# Patient Record
Sex: FEMALE | Race: WHITE | Employment: OTHER | URBAN - METROPOLITAN AREA
[De-identification: names, ages, dates, MRNs, and addresses within clinical notes are randomized per-mention and may not be internally consistent; named-entity substitution may affect disease eponyms.]

---

## 2018-09-05 ENCOUNTER — OFFICE VISIT (OUTPATIENT)
Dept: ENT CLINIC | Age: 73
End: 2018-09-05
Payer: MEDICARE

## 2018-09-05 VITALS
OXYGEN SATURATION: 100 % | HEIGHT: 60 IN | BODY MASS INDEX: 20.62 KG/M2 | HEART RATE: 78 BPM | DIASTOLIC BLOOD PRESSURE: 57 MMHG | WEIGHT: 105 LBS | SYSTOLIC BLOOD PRESSURE: 116 MMHG

## 2018-09-05 DIAGNOSIS — R42 VERTIGO: ICD-10-CM

## 2018-09-05 DIAGNOSIS — H61.23 BILATERAL IMPACTED CERUMEN: Primary | ICD-10-CM

## 2018-09-05 PROCEDURE — 99203 OFFICE O/P NEW LOW 30 MIN: CPT | Performed by: PHYSICIAN ASSISTANT

## 2018-09-05 PROCEDURE — 69210 REMOVE IMPACTED EAR WAX UNI: CPT | Performed by: PHYSICIAN ASSISTANT

## 2018-09-05 RX ORDER — MULTIVIT WITH MINERALS/LUTEIN
1000 TABLET ORAL DAILY
COMMUNITY

## 2018-09-05 RX ORDER — CALCIUM CARBONATE 500(1250)
500 TABLET ORAL DAILY
COMMUNITY

## 2018-09-05 ASSESSMENT — ENCOUNTER SYMPTOMS
GASTROINTESTINAL NEGATIVE: 1
SHORTNESS OF BREATH: 0
NAUSEA: 0
RESPIRATORY NEGATIVE: 1
SINUS PAIN: 0
VOMITING: 0
STRIDOR: 0
EYES NEGATIVE: 1
RHINORRHEA: 0

## 2018-09-05 NOTE — PROGRESS NOTES
respiratory distress. Musculoskeletal: Normal range of motion. Neurological: She is alert. No cranial nerve deficit. Skin: Skin is warm. No erythema. Psychiatric: She has a normal mood and affect. Her behavior is normal. Thought content normal.   Vitals reviewed. Cerumen removal     Auditory canal(s) both ears completely obstructed with cerumen. A microscope was used due to deep impaction of the cerumen in both ears. Cerumen was gently removed using loop. Tympanic membranes are intact following the procedure. Auditory canals appear normal.      DIAGNOSIS:    ICD-10-CM ICD-9-CM    1. Bilateral impacted cerumen H61.23 380.4 NM REMOVAL IMPACTED CERUMEN INSTRUMENTATION UNILAT   2. Vertigo R42 780.4      IMPRESSION/PLAN:  Patient that she can use Debrox regularly for maintenance. Advised patient that I would recommend vestibular therapy due to her chronic vertigo. Currently she defers. May reassess next visit. - Patient advised if vertigo symptoms occur to leave a telephone encounter for me and I will attempt to see the patient within a week, however advised patient that I'm only in the office in [facility] half a day once a week. If patient cannot get into the office as soon as she would like advised to report to the emergency department. Will attempt to get Dr. Veronica Lopez records including hearing evaluation. The plan was explained and patient is without any further questions. Follow up in 1 year(s), or if any new or worsening symptoms call the office to be seen sooner or report to the emergency department. This note was done using voice recognition software. There may be accidental errors in grammar or spelling.      EFRMIN Reyes

## 2025-04-01 ENCOUNTER — APPOINTMENT (OUTPATIENT)
Dept: CT IMAGING | Age: 80
DRG: 417 | End: 2025-04-01
Payer: MEDICARE

## 2025-04-01 ENCOUNTER — APPOINTMENT (OUTPATIENT)
Dept: GENERAL RADIOLOGY | Age: 80
DRG: 417 | End: 2025-04-01
Payer: MEDICARE

## 2025-04-01 ENCOUNTER — APPOINTMENT (OUTPATIENT)
Dept: ULTRASOUND IMAGING | Age: 80
DRG: 417 | End: 2025-04-01
Payer: MEDICARE

## 2025-04-01 ENCOUNTER — HOSPITAL ENCOUNTER (INPATIENT)
Age: 80
LOS: 3 days | Discharge: HOME | DRG: 417 | End: 2025-04-04
Attending: EMERGENCY MEDICINE | Admitting: INTERNAL MEDICINE
Payer: MEDICARE

## 2025-04-01 DIAGNOSIS — K85.10 ACUTE BILIARY PANCREATITIS WITHOUT INFECTION OR NECROSIS: ICD-10-CM

## 2025-04-01 DIAGNOSIS — K80.12 CALCULUS OF GALLBLADDER WITH ACUTE ON CHRONIC CHOLECYSTITIS WITHOUT OBSTRUCTION: ICD-10-CM

## 2025-04-01 DIAGNOSIS — K85.90 ACUTE PANCREATITIS, UNSPECIFIED COMPLICATION STATUS, UNSPECIFIED PANCREATITIS TYPE: Primary | ICD-10-CM

## 2025-04-01 LAB
ALBUMIN SERPL-MCNC: 4.4 G/DL (ref 3.5–5.2)
ALP SERPL-CCNC: 132 U/L (ref 35–104)
ALT SERPL-CCNC: 409 U/L (ref 0–32)
ANION GAP SERPL CALCULATED.3IONS-SCNC: 12 MMOL/L (ref 7–16)
AST SERPL-CCNC: 689 U/L (ref 0–31)
BASOPHILS # BLD: 0.01 K/UL (ref 0–0.2)
BASOPHILS NFR BLD: 0 % (ref 0–2)
BILIRUB SERPL-MCNC: 2.1 MG/DL (ref 0–1.2)
BNP SERPL-MCNC: 109 PG/ML (ref 0–450)
BUN SERPL-MCNC: 23 MG/DL (ref 6–23)
CALCIUM SERPL-MCNC: 9.7 MG/DL (ref 8.6–10.2)
CHLORIDE SERPL-SCNC: 104 MMOL/L (ref 98–107)
CO2 SERPL-SCNC: 24 MMOL/L (ref 22–29)
CREAT SERPL-MCNC: 0.8 MG/DL (ref 0.5–1)
D-DIMER QUANTITATIVE: 4283 NG/ML DDU (ref 0–230)
EOSINOPHIL # BLD: 0.01 K/UL (ref 0.05–0.5)
EOSINOPHILS RELATIVE PERCENT: 0 % (ref 0–6)
ERYTHROCYTE [DISTWIDTH] IN BLOOD BY AUTOMATED COUNT: 12.6 % (ref 11.5–15)
GFR, ESTIMATED: 76 ML/MIN/1.73M2
GLUCOSE SERPL-MCNC: 97 MG/DL (ref 74–99)
HCT VFR BLD AUTO: 38 % (ref 34–48)
HGB BLD-MCNC: 12.1 G/DL (ref 11.5–15.5)
IMM GRANULOCYTES # BLD AUTO: 0.04 K/UL (ref 0–0.58)
IMM GRANULOCYTES NFR BLD: 0 % (ref 0–5)
LACTATE BLDV-SCNC: 0.7 MMOL/L (ref 0.5–2.2)
LIPASE SERPL-CCNC: >3000 U/L (ref 13–60)
LYMPHOCYTES NFR BLD: 0.62 K/UL (ref 1.5–4)
LYMPHOCYTES RELATIVE PERCENT: 6 % (ref 20–42)
MCH RBC QN AUTO: 30.3 PG (ref 26–35)
MCHC RBC AUTO-ENTMCNC: 31.8 G/DL (ref 32–34.5)
MCV RBC AUTO: 95.2 FL (ref 80–99.9)
MONOCYTES NFR BLD: 0.91 K/UL (ref 0.1–0.95)
MONOCYTES NFR BLD: 9 % (ref 2–12)
NEUTROPHILS NFR BLD: 85 % (ref 43–80)
NEUTS SEG NFR BLD: 9.15 K/UL (ref 1.8–7.3)
PLATELET # BLD AUTO: 166 K/UL (ref 130–450)
PMV BLD AUTO: 10.5 FL (ref 7–12)
POTASSIUM SERPL-SCNC: 4 MMOL/L (ref 3.5–5)
PROT SERPL-MCNC: 6.9 G/DL (ref 6.4–8.3)
RBC # BLD AUTO: 3.99 M/UL (ref 3.5–5.5)
SODIUM SERPL-SCNC: 140 MMOL/L (ref 132–146)
TROPONIN I SERPL HS-MCNC: 31 NG/L (ref 0–9)
TROPONIN I SERPL HS-MCNC: 31 NG/L (ref 0–9)
WBC OTHER # BLD: 10.7 K/UL (ref 4.5–11.5)

## 2025-04-01 PROCEDURE — 99285 EMERGENCY DEPT VISIT HI MDM: CPT

## 2025-04-01 PROCEDURE — 6360000004 HC RX CONTRAST MEDICATION: Performed by: RADIOLOGY

## 2025-04-01 PROCEDURE — 2580000003 HC RX 258: Performed by: EMERGENCY MEDICINE

## 2025-04-01 PROCEDURE — 93005 ELECTROCARDIOGRAM TRACING: CPT | Performed by: EMERGENCY MEDICINE

## 2025-04-01 PROCEDURE — 1200000000 HC SEMI PRIVATE

## 2025-04-01 PROCEDURE — 76705 ECHO EXAM OF ABDOMEN: CPT

## 2025-04-01 PROCEDURE — 2580000003 HC RX 258: Performed by: NURSE PRACTITIONER

## 2025-04-01 PROCEDURE — 96374 THER/PROPH/DIAG INJ IV PUSH: CPT

## 2025-04-01 PROCEDURE — 85025 COMPLETE CBC W/AUTO DIFF WBC: CPT

## 2025-04-01 PROCEDURE — 2500000003 HC RX 250 WO HCPCS: Performed by: NURSE PRACTITIONER

## 2025-04-01 PROCEDURE — 71275 CT ANGIOGRAPHY CHEST: CPT

## 2025-04-01 PROCEDURE — 83690 ASSAY OF LIPASE: CPT

## 2025-04-01 PROCEDURE — 6360000002 HC RX W HCPCS: Performed by: EMERGENCY MEDICINE

## 2025-04-01 PROCEDURE — 85379 FIBRIN DEGRADATION QUANT: CPT

## 2025-04-01 PROCEDURE — 74177 CT ABD & PELVIS W/CONTRAST: CPT

## 2025-04-01 PROCEDURE — 80053 COMPREHEN METABOLIC PANEL: CPT

## 2025-04-01 PROCEDURE — 83605 ASSAY OF LACTIC ACID: CPT

## 2025-04-01 PROCEDURE — 84484 ASSAY OF TROPONIN QUANT: CPT

## 2025-04-01 PROCEDURE — 83880 ASSAY OF NATRIURETIC PEPTIDE: CPT

## 2025-04-01 PROCEDURE — 71046 X-RAY EXAM CHEST 2 VIEWS: CPT

## 2025-04-01 RX ORDER — SODIUM CHLORIDE 0.9 % (FLUSH) 0.9 %
10 SYRINGE (ML) INJECTION EVERY 12 HOURS SCHEDULED
Status: DISCONTINUED | OUTPATIENT
Start: 2025-04-01 | End: 2025-04-04 | Stop reason: HOSPADM

## 2025-04-01 RX ORDER — ONDANSETRON 4 MG/1
4 TABLET, ORALLY DISINTEGRATING ORAL EVERY 8 HOURS PRN
Status: DISCONTINUED | OUTPATIENT
Start: 2025-04-01 | End: 2025-04-04 | Stop reason: HOSPADM

## 2025-04-01 RX ORDER — SODIUM CHLORIDE, SODIUM LACTATE, POTASSIUM CHLORIDE, CALCIUM CHLORIDE 600; 310; 30; 20 MG/100ML; MG/100ML; MG/100ML; MG/100ML
INJECTION, SOLUTION INTRAVENOUS CONTINUOUS
Status: DISCONTINUED | OUTPATIENT
Start: 2025-04-01 | End: 2025-04-02

## 2025-04-01 RX ORDER — ENOXAPARIN SODIUM 100 MG/ML
30 INJECTION SUBCUTANEOUS DAILY
Status: DISCONTINUED | OUTPATIENT
Start: 2025-04-02 | End: 2025-04-04 | Stop reason: HOSPADM

## 2025-04-01 RX ORDER — SODIUM CHLORIDE 9 MG/ML
INJECTION, SOLUTION INTRAVENOUS PRN
Status: DISCONTINUED | OUTPATIENT
Start: 2025-04-01 | End: 2025-04-04 | Stop reason: HOSPADM

## 2025-04-01 RX ORDER — ACETAMINOPHEN 325 MG/1
650 TABLET ORAL EVERY 6 HOURS PRN
Status: CANCELLED | OUTPATIENT
Start: 2025-04-01

## 2025-04-01 RX ORDER — MECLIZINE HYDROCHLORIDE 25 MG/1
25 TABLET ORAL 3 TIMES DAILY PRN
COMMUNITY

## 2025-04-01 RX ORDER — MAGNESIUM SULFATE IN WATER 40 MG/ML
2000 INJECTION, SOLUTION INTRAVENOUS PRN
Status: DISCONTINUED | OUTPATIENT
Start: 2025-04-01 | End: 2025-04-04 | Stop reason: HOSPADM

## 2025-04-01 RX ORDER — POTASSIUM CHLORIDE 1500 MG/1
40 TABLET, EXTENDED RELEASE ORAL PRN
Status: DISCONTINUED | OUTPATIENT
Start: 2025-04-01 | End: 2025-04-04 | Stop reason: HOSPADM

## 2025-04-01 RX ORDER — IOPAMIDOL 755 MG/ML
75 INJECTION, SOLUTION INTRAVASCULAR
Status: COMPLETED | OUTPATIENT
Start: 2025-04-01 | End: 2025-04-01

## 2025-04-01 RX ORDER — MORPHINE SULFATE 2 MG/ML
2 INJECTION, SOLUTION INTRAMUSCULAR; INTRAVENOUS EVERY 4 HOURS PRN
Status: DISCONTINUED | OUTPATIENT
Start: 2025-04-01 | End: 2025-04-03

## 2025-04-01 RX ORDER — ONDANSETRON 2 MG/ML
4 INJECTION INTRAMUSCULAR; INTRAVENOUS ONCE
Status: COMPLETED | OUTPATIENT
Start: 2025-04-01 | End: 2025-04-01

## 2025-04-01 RX ORDER — 0.9 % SODIUM CHLORIDE 0.9 %
1000 INTRAVENOUS SOLUTION INTRAVENOUS ONCE
Status: COMPLETED | OUTPATIENT
Start: 2025-04-01 | End: 2025-04-01

## 2025-04-01 RX ORDER — ACETAMINOPHEN 650 MG/1
650 SUPPOSITORY RECTAL EVERY 6 HOURS PRN
Status: CANCELLED | OUTPATIENT
Start: 2025-04-01

## 2025-04-01 RX ORDER — ONDANSETRON 2 MG/ML
4 INJECTION INTRAMUSCULAR; INTRAVENOUS EVERY 6 HOURS PRN
Status: DISCONTINUED | OUTPATIENT
Start: 2025-04-01 | End: 2025-04-04 | Stop reason: HOSPADM

## 2025-04-01 RX ORDER — OMEPRAZOLE 20 MG/1
20 CAPSULE, DELAYED RELEASE ORAL DAILY PRN
COMMUNITY

## 2025-04-01 RX ORDER — SODIUM CHLORIDE 0.9 % (FLUSH) 0.9 %
10 SYRINGE (ML) INJECTION PRN
Status: DISCONTINUED | OUTPATIENT
Start: 2025-04-01 | End: 2025-04-04 | Stop reason: HOSPADM

## 2025-04-01 RX ORDER — POTASSIUM CHLORIDE 7.45 MG/ML
10 INJECTION INTRAVENOUS PRN
Status: DISCONTINUED | OUTPATIENT
Start: 2025-04-01 | End: 2025-04-04 | Stop reason: HOSPADM

## 2025-04-01 RX ORDER — FENTANYL CITRATE 50 UG/ML
25 INJECTION, SOLUTION INTRAMUSCULAR; INTRAVENOUS
Status: DISCONTINUED | OUTPATIENT
Start: 2025-04-01 | End: 2025-04-03

## 2025-04-01 RX ADMIN — SODIUM CHLORIDE 1000 ML: 0.9 INJECTION, SOLUTION INTRAVENOUS at 16:04

## 2025-04-01 RX ADMIN — IOPAMIDOL 75 ML: 755 INJECTION, SOLUTION INTRAVENOUS at 17:42

## 2025-04-01 RX ADMIN — SODIUM CHLORIDE, PRESERVATIVE FREE 10 ML: 5 INJECTION INTRAVENOUS at 21:44

## 2025-04-01 RX ADMIN — ONDANSETRON 4 MG: 2 INJECTION, SOLUTION INTRAMUSCULAR; INTRAVENOUS at 18:04

## 2025-04-01 RX ADMIN — SODIUM CHLORIDE, SODIUM LACTATE, POTASSIUM CHLORIDE, AND CALCIUM CHLORIDE: .6; .31; .03; .02 INJECTION, SOLUTION INTRAVENOUS at 21:41

## 2025-04-01 ASSESSMENT — PAIN DESCRIPTION - LOCATION: LOCATION: ABDOMEN

## 2025-04-01 ASSESSMENT — LIFESTYLE VARIABLES
HOW MANY STANDARD DRINKS CONTAINING ALCOHOL DO YOU HAVE ON A TYPICAL DAY: PATIENT DOES NOT DRINK
HOW OFTEN DO YOU HAVE A DRINK CONTAINING ALCOHOL: NEVER

## 2025-04-01 ASSESSMENT — PAIN SCALES - GENERAL
PAINLEVEL_OUTOF10: 0
PAINLEVEL_OUTOF10: 10

## 2025-04-01 ASSESSMENT — PAIN - FUNCTIONAL ASSESSMENT
PAIN_FUNCTIONAL_ASSESSMENT: NONE - DENIES PAIN
PAIN_FUNCTIONAL_ASSESSMENT: 0-10
PAIN_FUNCTIONAL_ASSESSMENT: NONE - DENIES PAIN

## 2025-04-01 NOTE — ED PROVIDER NOTES
HPI:  4/1/25, Time: 3:45 PM EDT         Lory Rodriguez is a 79 y.o. female presenting to the ED for epigastric pain nausea rating to her abdomen.  Onset several days.  He is no longer nauseated now complaining just abdominal pain.  Epigastric and upper quadrants bilaterally states has a history of hiatal hernia unsure of when her last EGD was.  She called her PCP advised to come to the emergency department for evaluation, beginning several days ago.  The complaint has been persistent, moderate in severity, and worsened by eating .  Patient denies melena hematochezia hematemesis.  She has no urinary complaints.    Review of Systems:   A complete review of systems was performed and pertinent positives and negatives are stated within HPI, all other systems reviewed and are negative.    --------------------------------------------- PAST HISTORY ---------------------------------------------  Past Medical History:  has a past medical history of Hiatal hernia.    Past Surgical History:  has a past surgical history that includes Colonoscopy.    Social History:  reports that she has never smoked. She has never used smokeless tobacco. She reports that she does not currently use alcohol. She reports that she does not use drugs.    Family History: family history includes No Known Problems in her father and mother.     The patient’s home medications have been reviewed.    Allergies: Pcn [penicillins] and Strawberry extract    -------------------------------------------------- RESULTS -------------------------------------------------  All laboratory and radiology results have been personally reviewed by myself   LABS:  Results for orders placed or performed during the hospital encounter of 04/01/25   Troponin   Result Value Ref Range    Troponin, High Sensitivity 31 (H) 0 - 9 ng/L   Lipase   Result Value Ref Range    Lipase >3,000 (H) 13 - 60 U/L   CBC with Auto Differential   Result Value Ref Range    WBC 10.7 4.5 -

## 2025-04-01 NOTE — PROGRESS NOTES
Database initiated. Patient is A&O independent from home with . States she uses no assistive devices and is RA at baseline.

## 2025-04-01 NOTE — ED TRIAGE NOTES
FIRST PROVIDER CONTACT ASSESSMENT NOTE       Department of Emergency Medicine                 First Provider Note            25  12:23 PM EDT    Date of Encounter: No admission date for patient encounter.    Patient Name: Lory Rodriguez  : 1945  MRN: [id number]    Chief Complaint: Abdominal Pain, Chest Pain, and Numbness (Left arm)      History of Present Illness:   Lory Rodriguez is a 79 y.o. female who presents to the ED for CP, abdominal pain and left arm numbness     Focused Physical Exam:  VS:    ED Triage Vitals [25 1219]   BP Systolic BP Percentile Diastolic BP Percentile Temp Temp Source Pulse Respirations SpO2   134/65 -- -- 97.3 °F (36.3 °C) Oral 79 16 100 %      Height Weight - Scale         1.524 m (5') 47.2 kg (104 lb)              Physical Ex: Constitutional: Alert and non-toxic.    Medical History:  has a past medical history of Hiatal hernia.  Surgical History:  has a past surgical history that includes Colonoscopy.  Social History:  reports that she has never smoked. She has never used smokeless tobacco. She reports that she does not drink alcohol and does not use drugs.  Family History: family history includes No Known Problems in her father and mother.    Allergies: Penicillins     Initial Plan of Care: Initiate Treatment-Testing, Proceed toTreatment Area When Bed Available for ED Attending/MLP to Continue Care      ---END OF FIRST PROVIDER CONTACT ASSESSMENT NOTE---  Electronically signed by Anna Saucedo PA-C   DD: 25

## 2025-04-02 ENCOUNTER — ANESTHESIA EVENT (OUTPATIENT)
Dept: OPERATING ROOM | Age: 80
End: 2025-04-02
Payer: MEDICARE

## 2025-04-02 ENCOUNTER — APPOINTMENT (OUTPATIENT)
Dept: MRI IMAGING | Age: 80
DRG: 417 | End: 2025-04-02
Payer: MEDICARE

## 2025-04-02 PROBLEM — K85.90 ACUTE PANCREATITIS: Status: ACTIVE | Noted: 2025-04-02

## 2025-04-02 LAB
ALBUMIN SERPL-MCNC: 3.4 G/DL (ref 3.5–5.2)
ALP SERPL-CCNC: 104 U/L (ref 35–104)
ALT SERPL-CCNC: 247 U/L (ref 0–32)
ANION GAP SERPL CALCULATED.3IONS-SCNC: 10 MMOL/L (ref 7–16)
AST SERPL-CCNC: 241 U/L (ref 0–31)
BASOPHILS # BLD: 0.02 K/UL (ref 0–0.2)
BASOPHILS NFR BLD: 0 % (ref 0–2)
BILIRUB SERPL-MCNC: 1.1 MG/DL (ref 0–1.2)
BUN SERPL-MCNC: 17 MG/DL (ref 6–23)
CALCIUM SERPL-MCNC: 9 MG/DL (ref 8.6–10.2)
CHLORIDE SERPL-SCNC: 110 MMOL/L (ref 98–107)
CO2 SERPL-SCNC: 23 MMOL/L (ref 22–29)
CREAT SERPL-MCNC: 0.8 MG/DL (ref 0.5–1)
EKG ATRIAL RATE: 75 BPM
EKG P AXIS: 57 DEGREES
EKG P-R INTERVAL: 166 MS
EKG Q-T INTERVAL: 388 MS
EKG QRS DURATION: 66 MS
EKG QTC CALCULATION (BAZETT): 433 MS
EKG R AXIS: 77 DEGREES
EKG T AXIS: 78 DEGREES
EKG VENTRICULAR RATE: 75 BPM
EOSINOPHIL # BLD: 0.04 K/UL (ref 0.05–0.5)
EOSINOPHILS RELATIVE PERCENT: 1 % (ref 0–6)
ERYTHROCYTE [DISTWIDTH] IN BLOOD BY AUTOMATED COUNT: 12.9 % (ref 11.5–15)
GFR, ESTIMATED: 77 ML/MIN/1.73M2
GLUCOSE BLD-MCNC: 106 MG/DL (ref 74–99)
GLUCOSE BLD-MCNC: 44 MG/DL (ref 74–99)
GLUCOSE BLD-MCNC: 48 MG/DL (ref 74–99)
GLUCOSE BLD-MCNC: 66 MG/DL (ref 74–99)
GLUCOSE BLD-MCNC: 75 MG/DL (ref 74–99)
GLUCOSE BLD-MCNC: 80 MG/DL (ref 74–99)
GLUCOSE BLD-MCNC: 89 MG/DL (ref 74–99)
GLUCOSE SERPL-MCNC: 54 MG/DL (ref 74–99)
HAV IGM SERPL QL IA: NONREACTIVE
HBV CORE IGM SERPL QL IA: NONREACTIVE
HBV SURFACE AG SERPL QL IA: NONREACTIVE
HCT VFR BLD AUTO: 32.4 % (ref 34–48)
HCV AB SERPL QL IA: NONREACTIVE
HGB BLD-MCNC: 10.4 G/DL (ref 11.5–15.5)
IMM GRANULOCYTES # BLD AUTO: <0.03 K/UL (ref 0–0.58)
IMM GRANULOCYTES NFR BLD: 0 % (ref 0–5)
LIPASE SERPL-CCNC: 514 U/L (ref 13–60)
LYMPHOCYTES NFR BLD: 1.14 K/UL (ref 1.5–4)
LYMPHOCYTES RELATIVE PERCENT: 18 % (ref 20–42)
MCH RBC QN AUTO: 30.7 PG (ref 26–35)
MCHC RBC AUTO-ENTMCNC: 32.1 G/DL (ref 32–34.5)
MCV RBC AUTO: 95.6 FL (ref 80–99.9)
MONOCYTES NFR BLD: 0.44 K/UL (ref 0.1–0.95)
MONOCYTES NFR BLD: 7 % (ref 2–12)
NEUTROPHILS NFR BLD: 74 % (ref 43–80)
NEUTS SEG NFR BLD: 4.64 K/UL (ref 1.8–7.3)
PLATELET # BLD AUTO: 141 K/UL (ref 130–450)
PMV BLD AUTO: 10.6 FL (ref 7–12)
POTASSIUM SERPL-SCNC: 4 MMOL/L (ref 3.5–5)
PROT SERPL-MCNC: 5.6 G/DL (ref 6.4–8.3)
RBC # BLD AUTO: 3.39 M/UL (ref 3.5–5.5)
SODIUM SERPL-SCNC: 143 MMOL/L (ref 132–146)
WBC OTHER # BLD: 6.3 K/UL (ref 4.5–11.5)

## 2025-04-02 PROCEDURE — 2580000003 HC RX 258

## 2025-04-02 PROCEDURE — 99222 1ST HOSP IP/OBS MODERATE 55: CPT | Performed by: STUDENT IN AN ORGANIZED HEALTH CARE EDUCATION/TRAINING PROGRAM

## 2025-04-02 PROCEDURE — 80053 COMPREHEN METABOLIC PANEL: CPT

## 2025-04-02 PROCEDURE — 85025 COMPLETE CBC W/AUTO DIFF WBC: CPT

## 2025-04-02 PROCEDURE — 93010 ELECTROCARDIOGRAM REPORT: CPT | Performed by: INTERNAL MEDICINE

## 2025-04-02 PROCEDURE — 83690 ASSAY OF LIPASE: CPT

## 2025-04-02 PROCEDURE — 1200000000 HC SEMI PRIVATE

## 2025-04-02 PROCEDURE — 74183 MRI ABD W/O CNTR FLWD CNTR: CPT

## 2025-04-02 PROCEDURE — 2580000003 HC RX 258: Performed by: INTERNAL MEDICINE

## 2025-04-02 PROCEDURE — 6360000004 HC RX CONTRAST MEDICATION: Performed by: RADIOLOGY

## 2025-04-02 PROCEDURE — 82962 GLUCOSE BLOOD TEST: CPT

## 2025-04-02 PROCEDURE — 2500000003 HC RX 250 WO HCPCS: Performed by: NURSE PRACTITIONER

## 2025-04-02 PROCEDURE — A9579 GAD-BASE MR CONTRAST NOS,1ML: HCPCS | Performed by: RADIOLOGY

## 2025-04-02 PROCEDURE — 2580000003 HC RX 258: Performed by: NURSE PRACTITIONER

## 2025-04-02 PROCEDURE — 80074 ACUTE HEPATITIS PANEL: CPT

## 2025-04-02 PROCEDURE — 6360000002 HC RX W HCPCS: Performed by: NURSE PRACTITIONER

## 2025-04-02 RX ORDER — GLUCAGON 1 MG/ML
1 KIT INJECTION PRN
Status: DISCONTINUED | OUTPATIENT
Start: 2025-04-02 | End: 2025-04-02 | Stop reason: SDUPTHER

## 2025-04-02 RX ORDER — DEXTROSE MONOHYDRATE 100 MG/ML
INJECTION, SOLUTION INTRAVENOUS CONTINUOUS PRN
Status: DISCONTINUED | OUTPATIENT
Start: 2025-04-02 | End: 2025-04-04 | Stop reason: HOSPADM

## 2025-04-02 RX ORDER — DEXTROSE MONOHYDRATE AND SODIUM CHLORIDE 5; .45 G/100ML; G/100ML
INJECTION, SOLUTION INTRAVENOUS CONTINUOUS
Status: DISCONTINUED | OUTPATIENT
Start: 2025-04-02 | End: 2025-04-03

## 2025-04-02 RX ORDER — INDOCYANINE GREEN AND WATER 25 MG
5 KIT INJECTION
Status: COMPLETED | OUTPATIENT
Start: 2025-04-03 | End: 2025-04-03

## 2025-04-02 RX ORDER — GLUCAGON 1 MG/ML
1 KIT INJECTION PRN
Status: DISCONTINUED | OUTPATIENT
Start: 2025-04-02 | End: 2025-04-04 | Stop reason: HOSPADM

## 2025-04-02 RX ORDER — DEXTROSE MONOHYDRATE 100 MG/ML
INJECTION, SOLUTION INTRAVENOUS CONTINUOUS PRN
Status: DISCONTINUED | OUTPATIENT
Start: 2025-04-02 | End: 2025-04-02 | Stop reason: SDUPTHER

## 2025-04-02 RX ADMIN — DEXTROSE MONOHYDRATE 125 ML: 100 INJECTION, SOLUTION INTRAVENOUS at 06:06

## 2025-04-02 RX ADMIN — GADOTERIDOL 10 ML: 279.3 INJECTION, SOLUTION INTRAVENOUS at 15:20

## 2025-04-02 RX ADMIN — DEXTROSE AND SODIUM CHLORIDE: 5; .45 INJECTION, SOLUTION INTRAVENOUS at 12:23

## 2025-04-02 RX ADMIN — SODIUM CHLORIDE, SODIUM LACTATE, POTASSIUM CHLORIDE, AND CALCIUM CHLORIDE: .6; .31; .03; .02 INJECTION, SOLUTION INTRAVENOUS at 07:40

## 2025-04-02 RX ADMIN — DEXTROSE AND SODIUM CHLORIDE: 5; .45 INJECTION, SOLUTION INTRAVENOUS at 16:22

## 2025-04-02 RX ADMIN — SODIUM CHLORIDE, PRESERVATIVE FREE 10 ML: 5 INJECTION INTRAVENOUS at 07:40

## 2025-04-02 RX ADMIN — ENOXAPARIN SODIUM 30 MG: 100 INJECTION SUBCUTANEOUS at 07:42

## 2025-04-02 RX ADMIN — DEXTROSE MONOHYDRATE 125 ML: 100 INJECTION, SOLUTION INTRAVENOUS at 10:46

## 2025-04-02 NOTE — CARE COORDINATION
Internal Medicine On-call Care Coordination Note    I was called by the ED physician because they recommended admission for this patient and we cover their PCP.  The history as I understand it after discussion with the ED physician is as follows:    Presents with epigastric pain   Lipase > 3000  CT abd showing possible gallstone pancreatitis    I placed admission orders.  Including:    General admission orders  Reconciled home meds  General surgery consulted in ED  NPO  IVF  Morphine prn    Dr. Kirk, or our coverage will see the patient tomorrow for H&P.    Electronically signed by NIKITA Andrade CNP on 4/1/2025 at 8:56 PM

## 2025-04-02 NOTE — PROGRESS NOTES
Pt's daughter is concerned her mom possibly has hepatitis due to receiving a blood transfusion in the past. I spoke with general surgery residents who stated there is no need to draw a hepatitis panel.

## 2025-04-02 NOTE — PROGRESS NOTES
Pt's AM blood sugar with labs was 54. FSBS showed 44. Pt asymptomatic at this time but she had no PRN orders and is NPO at this time.     RN called Kailyn Churchill NP, but there was no answer and no callback. PerfectServe sent to Dr. Tariq at this time. See new orders.

## 2025-04-02 NOTE — ACP (ADVANCE CARE PLANNING)
Advance Care Planning   Healthcare Decision Maker:    Primary Decision Maker: JenniferRajiv - [facility] - [phone]    Click here to complete Healthcare Decision Makers including selection of the Healthcare Decision Maker Relationship (ie \"Primary\").

## 2025-04-02 NOTE — H&P
Internal Medicine History & Physical     Name: Lory Rodriguez  : 1945  Chief Complaint: Abdominal Pain, Chest Pain, and Numbness (Left arm)  Primary Care Physician: Justice Broussard DO  Admission date: 2025  Date of service: 2025     History of Present Illness  Lory is a 79 y.o. year old female with no past medical history.  She presented to the ER on  with complaints of sudden onset abdominal and back pain that began a few hours prior to arrival.  She reports that on Tuesday night she did notice some mild discomfort in her back but did not think much of it and went to bed with no issues.  She reports that Wednesday morning she woke up without any issues, had some coffee and then when getting dressed noted diffuse abdominal pain radiating into her back.  She denies any nausea or vomiting.  Denies any shortness of breath or difficulty breathing.  Her symptoms were constant and severe, nothing in particular seem to make it better or worse.  She reports that she did feel like she was possibly having a heart attack so decided to come to the ER for evaluation.  Upon arrival to the ER she was noted to be hemodynamically stable.  Lab work was obtained and noted alkaline phosphatase 132, , , total bilirubin 2.1 and lipase >3000.  CT of the abdomen/pelvis was obtained and noted mildly distended gallbladder with pericholecystic fluid however no gallstones, sludge or gallbladder wall thickening.  Right upper quadrant ultrasound was also obtained and unremarkable.  She was given IV fluids and antiemetics and admitted for further management.    Currently she is seen lying in bed awake and alert, in no distress.  She does report feeling much better today, really is not having any more pain.  She denies any nausea or vomiting.  Denies any shortness of breath or difficulty breathing.  She reports that her mouth is dry, asking when she will be able to eat or drink something.  General surgery    Final Result   No acute disease.         MRI ABDOMEN W WO CONTRAST MRCP    (Results Pending)       Assessment  Active Hospital Problems    Diagnosis     Acute pancreatitis without necrosis or infection, unspecified [K85.90]        Plan  Acute Gallstone Pancreatitis and Acute Cholecystitis   CT abdomen/pelvis and RUQ US noted  Lipase >3000 -- AM labs pending  LFTs/Total Bilirubin improving  Check MRCP -- may need ERCP pending results  Continue NPO with IV fluids for now  Continue IV Morphine/Fentanyl for pain  General Surgery consult appreciated -- will eventually need cholecystectomy when acute pancreatitis has resolved    Hypoglycemia  Check A1C  Add hypoglycemia protocol    Continue home medications.  PT/OT  Follow labs  DVT prophylaxis.  Please see orders for further management and care.   for discharge planning  Discharge plan: home when stable    The pertinent details of this case were discussed with Dr. Kirk.    Ada Mason, APRN - CNP   4/2/2025  8:10 AM    NOTE:  This report was transcribed using voice recognition software.  Every effort was made to ensure accuracy; however, inadvertent computerized transcription errors may be present.    Addendum: I have personally participated in a face-to-face history and physical exam on the date of service with the patient. I have discussed the case with the nurse practitioner. I also participated in medical decision making on the date of service and I agree with all of the pertinent clinical information unless indicated in my editing of the note. I have reviewed and edited the note above based on my findings during my history, exam, and decision making on the same day of service.     My additional thoughts:   She presented to the hospital with a chief complaint of abdominal pain that radiated to her back.  She was found to have acute gallstone pancreatitis and cholecystitis.  She most likely had a passed gallstone as her labs are better and she

## 2025-04-02 NOTE — CONSULTS
General Surgery   Consult Note      Patient's Name/Date of Birth: Lory Rodriguez / 1945    Date: April 2, 2025     PCP: Justice Broussard DO     Reason for consult:: Gallstone pancreatitis     HPI:   Lory Rodriguez is a 79 y.o. female who presents for evaluation of epigastric pain and Nnausea. No vomiting. Complains of some chills but no fevers. She said this started several days ago. Says this never happened to her in the past. She says the pain radiates to her back. Otherwise, has no complains. She currently feels much better and pain has almost resolved. Patient has no significant medical or surgical history. She takes aspirin, but no blood thinners.     In the ED, CTAP with pericholecystic fluid. GB wall thickening. US RUQ showed normal CBD size.       Patient Active Problem List   Diagnosis    Acute pancreatitis without necrosis or infection, unspecified       Allergies   Allergen Reactions    Pcn [Penicillins] Hives       Past Medical History:   Diagnosis Date    Hiatal hernia        Past Surgical History:   Procedure Laterality Date    COLONOSCOPY         Social History     Socioeconomic History    Marital status:      Spouse name: Not on file    Number of children: Not on file    Years of education: Not on file    Highest education level: Not on file   Occupational History    Not on file   Tobacco Use    Smoking status: Never    Smokeless tobacco: Never   Vaping Use    Vaping status: Never Used   Substance and Sexual Activity    Alcohol use: Not Currently    Drug use: No    Sexual activity: Not on file   Other Topics Concern    Not on file   Social History Narrative    Not on file     Social Drivers of Health     Financial Resource Strain: Not on file   Food Insecurity: No Food Insecurity (4/1/2025)    Hunger Vital Sign     Worried About Running Out of Food in the Last Year: Never true     Ran Out of Food in the Last Year: Never true   Transportation Needs: No Transportation Needs  Multimodal pain control   - Follow up AM lipase/bili - if bili keeps uptrending will need MRCP or even ERCP   - Will need cholecystectoym once pancreatitis resolves  - Rest per primary       Mackenzie Cano,   General Surgery Resident

## 2025-04-02 NOTE — PROGRESS NOTES
P Quality Flow/Interdisciplinary Rounds Progress Note        Quality Flow Rounds held on April 2, 2025    Disciplines Attending:  Bedside Nurse, , , and Nursing Unit Leadership    Lory Rodriguez was admitted on 4/1/2025  1:15 PM    Anticipated Discharge Date:       Disposition:    Emmett Score:  Emmett Scale Score: 22    [facility] RISK OF UNPLANNED READMISSION 2.0             8.1 Total Score        Discussed patient goal for the day, patient clinical progression, and barriers to discharge.  The following Goal(s) of the Day/Commitment(s) have been identified:   Discharge Planning      Shirley Sofia RN  April 2, 2025

## 2025-04-02 NOTE — CARE COORDINATION
Met with patient and spouse, Rajiv about diagnosis and discharge plan of care. Pt admit with acute gallstone pancreatitis and acute cholecystitis. Surgery following, pt NPO for MRCP, possible lap cholecystectomy tomorrow. Iv fluids. Pt lives with spouse in split level home. Uses no DME. PCP is Dr Broussard. Will continue to follow post op. At this time, no needs for home-mjo

## 2025-04-02 NOTE — ED NOTES
.ED to Inpatient Handoff Report    Notified floor that electronic handoff available and patient ready for transport to room 732.    Safety Risks: None identified    Patient in Restraints: no    Constant Observer or Patient : no    Telemetry Monitoring Ordered: No          Order to transfer to unit without monitor: NA    Last MEWS: 1 Time completed: 2044    Deterioration Index: 19.42    Vitals:    04/01/25 1219 04/01/25 1806 04/01/25 1925 04/01/25 2044   BP: 134/65 (!) 152/82 (!) 138/58 (!) 142/65   Pulse: 79 79 75 68   Resp: 16 16 16 16   Temp: 97.3 °F (36.3 °C) 98 °F (36.7 °C)  98.3 °F (36.8 °C)   TempSrc: Oral Oral  Oral   SpO2: 100% 100% 98% 98%   Weight: 47.2 kg (104 lb)      Height: 1.524 m (5')          Opportunity for questions and clarification was provided.

## 2025-04-02 NOTE — PROGRESS NOTES
4 Eyes Skin Assessment     NAME:  Lory Rodriguez  YOB: 1945  MEDICAL RECORD NUMBER:  [id number]    The patient is being assessed for  Admission    I agree that at least one RN has performed a thorough Head to Toe Skin Assessment on the patient. ALL assessment sites listed below have been assessed.      Areas assessed by both nurses:    Head, Face, Ears, Shoulders, Back, Chest, Arms, Elbows, Hands, Sacrum. Buttock, Coccyx, Ischium, Legs. Feet and Heels, and Under Medical Devices         Does the Patient have a Wound? No noted wound(s)       Emmett Prevention initiated by RN: No  Wound Care Orders initiated by RN: No    Pressure Injury (Stage 3,4, Unstageable, DTI, NWPT, and Complex wounds) if present, place Wound referral order by RN under : No    New Ostomies, if present place, Ostomy referral order under : No     Nurse 1 eSignature: Electronically signed by Jelena Aparicio RN on 4/1/25 at 10:34 PM EDT    **SHARE this note so that the co-signing nurse can place an eSignature**    Nurse 2 eSignature: {Esignature:[id number]}   complains of pain/discomfort

## 2025-04-03 ENCOUNTER — APPOINTMENT (OUTPATIENT)
Dept: GENERAL RADIOLOGY | Age: 80
DRG: 417 | End: 2025-04-03
Payer: MEDICARE

## 2025-04-03 ENCOUNTER — ANESTHESIA (OUTPATIENT)
Dept: OPERATING ROOM | Age: 80
End: 2025-04-03
Payer: MEDICARE

## 2025-04-03 LAB
ABO + RH BLD: NORMAL
ALBUMIN SERPL-MCNC: 3.5 G/DL (ref 3.5–5.2)
ALP SERPL-CCNC: 116 U/L (ref 35–104)
ALT SERPL-CCNC: 216 U/L (ref 0–32)
ANION GAP SERPL CALCULATED.3IONS-SCNC: 10 MMOL/L (ref 7–16)
ARM BAND NUMBER: NORMAL
AST SERPL-CCNC: 190 U/L (ref 0–31)
BASOPHILS # BLD: 0.04 K/UL (ref 0–0.2)
BASOPHILS NFR BLD: 1 % (ref 0–2)
BILIRUB SERPL-MCNC: 1.4 MG/DL (ref 0–1.2)
BLOOD BANK SAMPLE EXPIRATION: NORMAL
BLOOD GROUP ANTIBODIES SERPL: NEGATIVE
BUN SERPL-MCNC: 10 MG/DL (ref 6–23)
CALCIUM SERPL-MCNC: 9.2 MG/DL (ref 8.6–10.2)
CHLORIDE SERPL-SCNC: 109 MMOL/L (ref 98–107)
CO2 SERPL-SCNC: 23 MMOL/L (ref 22–29)
CREAT SERPL-MCNC: 0.7 MG/DL (ref 0.5–1)
EOSINOPHIL # BLD: 0.11 K/UL (ref 0.05–0.5)
EOSINOPHILS RELATIVE PERCENT: 2 % (ref 0–6)
ERYTHROCYTE [DISTWIDTH] IN BLOOD BY AUTOMATED COUNT: 12.8 % (ref 11.5–15)
GFR, ESTIMATED: 86 ML/MIN/1.73M2
GLUCOSE BLD-MCNC: 138 MG/DL (ref 74–99)
GLUCOSE BLD-MCNC: 147 MG/DL (ref 74–99)
GLUCOSE BLD-MCNC: 71 MG/DL (ref 74–99)
GLUCOSE BLD-MCNC: 83 MG/DL (ref 74–99)
GLUCOSE BLD-MCNC: 83 MG/DL (ref 74–99)
GLUCOSE SERPL-MCNC: 89 MG/DL (ref 74–99)
HBA1C MFR BLD: 4.7 % (ref 4–5.6)
HCT VFR BLD AUTO: 31.9 % (ref 34–48)
HGB BLD-MCNC: 10 G/DL (ref 11.5–15.5)
IMM GRANULOCYTES # BLD AUTO: <0.03 K/UL (ref 0–0.58)
IMM GRANULOCYTES NFR BLD: 0 % (ref 0–5)
LIPASE SERPL-CCNC: 535 U/L (ref 13–60)
LYMPHOCYTES NFR BLD: 1.33 K/UL (ref 1.5–4)
LYMPHOCYTES RELATIVE PERCENT: 26 % (ref 20–42)
MCH RBC QN AUTO: 29.9 PG (ref 26–35)
MCHC RBC AUTO-ENTMCNC: 31.3 G/DL (ref 32–34.5)
MCV RBC AUTO: 95.5 FL (ref 80–99.9)
MONOCYTES NFR BLD: 0.52 K/UL (ref 0.1–0.95)
MONOCYTES NFR BLD: 10 % (ref 2–12)
NEUTROPHILS NFR BLD: 60 % (ref 43–80)
NEUTS SEG NFR BLD: 3.06 K/UL (ref 1.8–7.3)
PLATELET # BLD AUTO: 141 K/UL (ref 130–450)
PMV BLD AUTO: 10.8 FL (ref 7–12)
POTASSIUM SERPL-SCNC: 3.7 MMOL/L (ref 3.5–5)
PROT SERPL-MCNC: 5.7 G/DL (ref 6.4–8.3)
RBC # BLD AUTO: 3.34 M/UL (ref 3.5–5.5)
SODIUM SERPL-SCNC: 142 MMOL/L (ref 132–146)
WBC OTHER # BLD: 5.1 K/UL (ref 4.5–11.5)

## 2025-04-03 PROCEDURE — 6370000000 HC RX 637 (ALT 250 FOR IP)

## 2025-04-03 PROCEDURE — 83690 ASSAY OF LIPASE: CPT

## 2025-04-03 PROCEDURE — 2709999900 HC NON-CHARGEABLE SUPPLY: Performed by: STUDENT IN AN ORGANIZED HEALTH CARE EDUCATION/TRAINING PROGRAM

## 2025-04-03 PROCEDURE — 2500000003 HC RX 250 WO HCPCS: Performed by: NURSE ANESTHETIST, CERTIFIED REGISTERED

## 2025-04-03 PROCEDURE — 88304 TISSUE EXAM BY PATHOLOGIST: CPT

## 2025-04-03 PROCEDURE — BF131ZZ FLUOROSCOPY OF GALLBLADDER AND BILE DUCTS USING LOW OSMOLAR CONTRAST: ICD-10-PCS | Performed by: STUDENT IN AN ORGANIZED HEALTH CARE EDUCATION/TRAINING PROGRAM

## 2025-04-03 PROCEDURE — S2900 ROBOTIC SURGICAL SYSTEM: HCPCS | Performed by: STUDENT IN AN ORGANIZED HEALTH CARE EDUCATION/TRAINING PROGRAM

## 2025-04-03 PROCEDURE — 2580000003 HC RX 258

## 2025-04-03 PROCEDURE — 2500000003 HC RX 250 WO HCPCS: Performed by: STUDENT IN AN ORGANIZED HEALTH CARE EDUCATION/TRAINING PROGRAM

## 2025-04-03 PROCEDURE — 0FT44ZZ RESECTION OF GALLBLADDER, PERCUTANEOUS ENDOSCOPIC APPROACH: ICD-10-PCS | Performed by: STUDENT IN AN ORGANIZED HEALTH CARE EDUCATION/TRAINING PROGRAM

## 2025-04-03 PROCEDURE — 3600000019 HC SURGERY ROBOT ADDTL 15MIN: Performed by: STUDENT IN AN ORGANIZED HEALTH CARE EDUCATION/TRAINING PROGRAM

## 2025-04-03 PROCEDURE — 80053 COMPREHEN METABOLIC PANEL: CPT

## 2025-04-03 PROCEDURE — 6360000002 HC RX W HCPCS: Performed by: NURSE PRACTITIONER

## 2025-04-03 PROCEDURE — 83036 HEMOGLOBIN GLYCOSYLATED A1C: CPT

## 2025-04-03 PROCEDURE — 6360000002 HC RX W HCPCS

## 2025-04-03 PROCEDURE — 87081 CULTURE SCREEN ONLY: CPT

## 2025-04-03 PROCEDURE — 8E0W4CZ ROBOTIC ASSISTED PROCEDURE OF TRUNK REGION, PERCUTANEOUS ENDOSCOPIC APPROACH: ICD-10-PCS | Performed by: STUDENT IN AN ORGANIZED HEALTH CARE EDUCATION/TRAINING PROGRAM

## 2025-04-03 PROCEDURE — 2500000003 HC RX 250 WO HCPCS

## 2025-04-03 PROCEDURE — 6360000002 HC RX W HCPCS: Performed by: NURSE ANESTHETIST, CERTIFIED REGISTERED

## 2025-04-03 PROCEDURE — 47563 LAPARO CHOLECYSTECTOMY/GRAPH: CPT | Performed by: STUDENT IN AN ORGANIZED HEALTH CARE EDUCATION/TRAINING PROGRAM

## 2025-04-03 PROCEDURE — 85025 COMPLETE CBC W/AUTO DIFF WBC: CPT

## 2025-04-03 PROCEDURE — 3700000001 HC ADD 15 MINUTES (ANESTHESIA): Performed by: STUDENT IN AN ORGANIZED HEALTH CARE EDUCATION/TRAINING PROGRAM

## 2025-04-03 PROCEDURE — 86900 BLOOD TYPING SEROLOGIC ABO: CPT

## 2025-04-03 PROCEDURE — 2720000010 HC SURG SUPPLY STERILE: Performed by: STUDENT IN AN ORGANIZED HEALTH CARE EDUCATION/TRAINING PROGRAM

## 2025-04-03 PROCEDURE — 2500000003 HC RX 250 WO HCPCS: Performed by: NURSE PRACTITIONER

## 2025-04-03 PROCEDURE — 6360000002 HC RX W HCPCS: Performed by: ANESTHESIOLOGY

## 2025-04-03 PROCEDURE — 7100000001 HC PACU RECOVERY - ADDTL 15 MIN: Performed by: STUDENT IN AN ORGANIZED HEALTH CARE EDUCATION/TRAINING PROGRAM

## 2025-04-03 PROCEDURE — 82962 GLUCOSE BLOOD TEST: CPT

## 2025-04-03 PROCEDURE — 1200000000 HC SEMI PRIVATE

## 2025-04-03 PROCEDURE — 3700000000 HC ANESTHESIA ATTENDED CARE: Performed by: STUDENT IN AN ORGANIZED HEALTH CARE EDUCATION/TRAINING PROGRAM

## 2025-04-03 PROCEDURE — 3600000009 HC SURGERY ROBOT BASE: Performed by: STUDENT IN AN ORGANIZED HEALTH CARE EDUCATION/TRAINING PROGRAM

## 2025-04-03 PROCEDURE — 7100000000 HC PACU RECOVERY - FIRST 15 MIN: Performed by: STUDENT IN AN ORGANIZED HEALTH CARE EDUCATION/TRAINING PROGRAM

## 2025-04-03 PROCEDURE — 86901 BLOOD TYPING SEROLOGIC RH(D): CPT

## 2025-04-03 PROCEDURE — 6360000004 HC RX CONTRAST MEDICATION: Performed by: STUDENT IN AN ORGANIZED HEALTH CARE EDUCATION/TRAINING PROGRAM

## 2025-04-03 PROCEDURE — 86850 RBC ANTIBODY SCREEN: CPT

## 2025-04-03 PROCEDURE — 2580000003 HC RX 258: Performed by: INTERNAL MEDICINE

## 2025-04-03 RX ORDER — LABETALOL HYDROCHLORIDE 5 MG/ML
INJECTION, SOLUTION INTRAVENOUS
Status: DISCONTINUED | OUTPATIENT
Start: 2025-04-03 | End: 2025-04-03 | Stop reason: SDUPTHER

## 2025-04-03 RX ORDER — IOPAMIDOL 612 MG/ML
INJECTION, SOLUTION INTRAVASCULAR PRN
Status: DISCONTINUED | OUTPATIENT
Start: 2025-04-03 | End: 2025-04-03 | Stop reason: ALTCHOICE

## 2025-04-03 RX ORDER — PROCHLORPERAZINE EDISYLATE 5 MG/ML
5 INJECTION INTRAMUSCULAR; INTRAVENOUS
Status: COMPLETED | OUTPATIENT
Start: 2025-04-03 | End: 2025-04-03

## 2025-04-03 RX ORDER — BUPIVACAINE HYDROCHLORIDE AND EPINEPHRINE 2.5; 5 MG/ML; UG/ML
INJECTION, SOLUTION EPIDURAL; INFILTRATION; INTRACAUDAL; PERINEURAL PRN
Status: DISCONTINUED | OUTPATIENT
Start: 2025-04-03 | End: 2025-04-03 | Stop reason: ALTCHOICE

## 2025-04-03 RX ORDER — ROCURONIUM BROMIDE 10 MG/ML
INJECTION, SOLUTION INTRAVENOUS
Status: DISCONTINUED | OUTPATIENT
Start: 2025-04-03 | End: 2025-04-03 | Stop reason: SDUPTHER

## 2025-04-03 RX ORDER — NALOXONE HYDROCHLORIDE 0.4 MG/ML
INJECTION, SOLUTION INTRAMUSCULAR; INTRAVENOUS; SUBCUTANEOUS PRN
Status: DISCONTINUED | OUTPATIENT
Start: 2025-04-03 | End: 2025-04-03 | Stop reason: HOSPADM

## 2025-04-03 RX ORDER — DEXAMETHASONE SODIUM PHOSPHATE 4 MG/ML
INJECTION, SOLUTION INTRA-ARTICULAR; INTRALESIONAL; INTRAMUSCULAR; INTRAVENOUS; SOFT TISSUE
Status: DISCONTINUED | OUTPATIENT
Start: 2025-04-03 | End: 2025-04-03 | Stop reason: SDUPTHER

## 2025-04-03 RX ORDER — ONDANSETRON 2 MG/ML
INJECTION INTRAMUSCULAR; INTRAVENOUS
Status: DISCONTINUED | OUTPATIENT
Start: 2025-04-03 | End: 2025-04-03 | Stop reason: SDUPTHER

## 2025-04-03 RX ORDER — OXYCODONE HYDROCHLORIDE 5 MG/1
5 TABLET ORAL EVERY 4 HOURS PRN
Refills: 0 | Status: DISCONTINUED | OUTPATIENT
Start: 2025-04-03 | End: 2025-04-04 | Stop reason: HOSPADM

## 2025-04-03 RX ORDER — SODIUM CHLORIDE 0.9 % (FLUSH) 0.9 %
5-40 SYRINGE (ML) INJECTION PRN
Status: DISCONTINUED | OUTPATIENT
Start: 2025-04-03 | End: 2025-04-03 | Stop reason: HOSPADM

## 2025-04-03 RX ORDER — SODIUM CHLORIDE 0.9 % (FLUSH) 0.9 %
5-40 SYRINGE (ML) INJECTION EVERY 12 HOURS SCHEDULED
Status: DISCONTINUED | OUTPATIENT
Start: 2025-04-03 | End: 2025-04-03 | Stop reason: HOSPADM

## 2025-04-03 RX ORDER — SODIUM CHLORIDE 9 MG/ML
INJECTION, SOLUTION INTRAVENOUS PRN
Status: DISCONTINUED | OUTPATIENT
Start: 2025-04-03 | End: 2025-04-03 | Stop reason: HOSPADM

## 2025-04-03 RX ORDER — PROPOFOL 10 MG/ML
INJECTION, EMULSION INTRAVENOUS
Status: DISCONTINUED | OUTPATIENT
Start: 2025-04-03 | End: 2025-04-03 | Stop reason: SDUPTHER

## 2025-04-03 RX ORDER — SODIUM CHLORIDE 9 MG/ML
INJECTION, SOLUTION INTRAVENOUS
Status: DISCONTINUED | OUTPATIENT
Start: 2025-04-03 | End: 2025-04-03 | Stop reason: SDUPTHER

## 2025-04-03 RX ORDER — OXYCODONE HYDROCHLORIDE 5 MG/1
2.5 TABLET ORAL EVERY 4 HOURS PRN
Refills: 0 | Status: DISCONTINUED | OUTPATIENT
Start: 2025-04-03 | End: 2025-04-04 | Stop reason: HOSPADM

## 2025-04-03 RX ORDER — ACETAMINOPHEN 325 MG/1
650 TABLET ORAL EVERY 6 HOURS SCHEDULED
Status: DISCONTINUED | OUTPATIENT
Start: 2025-04-03 | End: 2025-04-04 | Stop reason: HOSPADM

## 2025-04-03 RX ORDER — KETOROLAC TROMETHAMINE 15 MG/ML
15 INJECTION, SOLUTION INTRAMUSCULAR; INTRAVENOUS
Status: DISCONTINUED | OUTPATIENT
Start: 2025-04-03 | End: 2025-04-03 | Stop reason: HOSPADM

## 2025-04-03 RX ORDER — FENTANYL CITRATE 50 UG/ML
INJECTION, SOLUTION INTRAMUSCULAR; INTRAVENOUS
Status: DISCONTINUED | OUTPATIENT
Start: 2025-04-03 | End: 2025-04-03 | Stop reason: SDUPTHER

## 2025-04-03 RX ORDER — LIDOCAINE HYDROCHLORIDE 20 MG/ML
INJECTION, SOLUTION EPIDURAL; INFILTRATION; INTRACAUDAL; PERINEURAL
Status: DISCONTINUED | OUTPATIENT
Start: 2025-04-03 | End: 2025-04-03 | Stop reason: SDUPTHER

## 2025-04-03 RX ADMIN — WATER 2000 MG: 1 INJECTION INTRAMUSCULAR; INTRAVENOUS; SUBCUTANEOUS at 13:40

## 2025-04-03 RX ADMIN — ONDANSETRON 4 MG: 2 INJECTION INTRAMUSCULAR; INTRAVENOUS at 14:36

## 2025-04-03 RX ADMIN — LIDOCAINE HYDROCHLORIDE 60 MG: 20 INJECTION, SOLUTION EPIDURAL; INFILTRATION; INTRACAUDAL; PERINEURAL at 13:30

## 2025-04-03 RX ADMIN — LABETALOL HYDROCHLORIDE 5 MG: 5 INJECTION INTRAVENOUS at 14:24

## 2025-04-03 RX ADMIN — SUGAMMADEX 200 MG: 100 INJECTION, SOLUTION INTRAVENOUS at 14:37

## 2025-04-03 RX ADMIN — HYDROMORPHONE HYDROCHLORIDE 0.5 MG: 1 INJECTION, SOLUTION INTRAMUSCULAR; INTRAVENOUS; SUBCUTANEOUS at 15:19

## 2025-04-03 RX ADMIN — PROCHLORPERAZINE EDISYLATE 5 MG: 5 INJECTION INTRAMUSCULAR; INTRAVENOUS at 14:58

## 2025-04-03 RX ADMIN — DEXAMETHASONE SODIUM PHOSPHATE 8 MG: 4 INJECTION, SOLUTION INTRAMUSCULAR; INTRAVENOUS at 13:30

## 2025-04-03 RX ADMIN — SODIUM CHLORIDE, PRESERVATIVE FREE 10 ML: 5 INJECTION INTRAVENOUS at 22:18

## 2025-04-03 RX ADMIN — SODIUM CHLORIDE: 9 INJECTION, SOLUTION INTRAVENOUS at 13:24

## 2025-04-03 RX ADMIN — PROPOFOL 100 MG: 10 INJECTION, EMULSION INTRAVENOUS at 13:30

## 2025-04-03 RX ADMIN — DEXTROSE AND SODIUM CHLORIDE: 5; .45 INJECTION, SOLUTION INTRAVENOUS at 00:52

## 2025-04-03 RX ADMIN — ACETAMINOPHEN 650 MG: 325 TABLET ORAL at 18:03

## 2025-04-03 RX ADMIN — INDOCYANINE GREEN AND WATER 5 MG: KIT at 12:52

## 2025-04-03 RX ADMIN — FENTANYL CITRATE 50 MCG: 50 INJECTION, SOLUTION INTRAMUSCULAR; INTRAVENOUS at 14:08

## 2025-04-03 RX ADMIN — ROCURONIUM BROMIDE 50 MG: 10 INJECTION, SOLUTION INTRAVENOUS at 13:30

## 2025-04-03 RX ADMIN — FENTANYL CITRATE 50 MCG: 50 INJECTION, SOLUTION INTRAMUSCULAR; INTRAVENOUS at 13:30

## 2025-04-03 RX ADMIN — OXYCODONE HYDROCHLORIDE 2.5 MG: 5 TABLET ORAL at 22:39

## 2025-04-03 RX ADMIN — ONDANSETRON 4 MG: 2 INJECTION, SOLUTION INTRAMUSCULAR; INTRAVENOUS at 15:31

## 2025-04-03 ASSESSMENT — PAIN DESCRIPTION - DESCRIPTORS: DESCRIPTORS: ACHING

## 2025-04-03 ASSESSMENT — PAIN SCALES - GENERAL
PAINLEVEL_OUTOF10: 5
PAINLEVEL_OUTOF10: 7

## 2025-04-03 ASSESSMENT — PAIN DESCRIPTION - ORIENTATION: ORIENTATION: OTHER (COMMENT)

## 2025-04-03 ASSESSMENT — PAIN - FUNCTIONAL ASSESSMENT: PAIN_FUNCTIONAL_ASSESSMENT: NONE - DENIES PAIN

## 2025-04-03 ASSESSMENT — PAIN DESCRIPTION - LOCATION: LOCATION: ABDOMEN;LEG

## 2025-04-03 NOTE — PROGRESS NOTES
Spiritual Health History and Assessment/Progress Note  [facility]     Encounter, Rituals, Rites and Sacraments,  ,  ,      Name: Lory Rodriguez MRN: [id number]    Age: 79 y.o.     Sex: female   Language: English   Buddhism: Jew   Acute pancreatitis without necrosis or infection, unspecified     Date: 4/3/2025                           Spiritual Assessment began in [address] SURG        Referral/Consult From: Rounding   Encounter Overview/Reason:  Encounter, Rituals, Rites and Sacraments  Service Provided For: Patient    Shawna, Belief, Meaning:   Patient is connected with a shawna tradition or spiritual practice  Family/Friends are connected with a shawna tradition or spiritual practice      Importance and Influence:  Patient has no beliefs influential to healthcare decision-making identified during this visit  Family/Friends have no beliefs influential to healthcare decision-making identified during this visit    Community:  Patient feels well-supported. Support system includes: Spouse/Partner  Family/Friends feel well-supported. Support system includes: Children    Assessment and Plan of Care:     Patient Interventions include: Facilitated expression of thoughts and feelings, Affirmed coping skills/support systems, and Provided sacramental/Anabaptism ritual  Family/Friends Interventions include: Facilitated expression of thoughts and feelings and Affirmed coping skills/support systems    Patient Plan of Care: Spiritual Care available upon further referral  Family/Friends Plan of Care: Spiritual Care available upon further referral    Electronically signed by Chaplain Yolanda on 4/3/2025 at 6:24 PM

## 2025-04-03 NOTE — OP NOTE
Operative Note      Patient: Lory Rodriguez  YOB: 1945  MRN: [id number]    Date of Procedure: 4/3/2025    Pre-Op Diagnosis Codes:      * Calculus of gallbladder with acute on chronic cholecystitis without obstruction [K80.12]    Post-Op Diagnosis: Same, Adhesive disease, dilated pancreatic duct        Procedure(s):  LAPAROSCOPIC ROBOTIC XI ASSISTED CHOLECYSTECTOMY INTRAOPERATIVE CHOLANGIOGRAM with extensive FLORA     Surgeon(s):  Jomar Araujo DO    Assistant:   Resident: Mackenzie Cano DO; Jewel Martines MD    Anesthesia: General    Estimated Blood Loss (mL): Minimal    Complications: None    Specimens:   ID Type Source Tests Collected by Time Destination   A : Gallbladder Tissue Gallbladder SURGICAL PATHOLOGY Jomar Araujo DO 4/3/2025 1349        Implants:  * No implants in log *      Drains: * No LDAs found *    Findings:  Infection Present At Time Of Surgery (PATOS) (choose all levels that have infection present):  No infection present  Other Findings: Adhesive disease, dilated pancreatic duct       DESCRIPTION OF PROCEDURE:     The patient was taken to the operating room and placed supine on the operating room table, administered general anesthesia and intubated. Once the airway was secured and the patient was adequately sedated a time-out was performed to confirm the surgical site and the patient's name.      We initially made an 8mm incision incision at the LUQ and inserted the Veress needle. Confirmed to be in place we insufflated to 15mmHg mercury. The needle was removed and an 8mm camera trocar was inserted. An 8-mm incision was made in the left side of the abomen and a robotic port was inserted. The camera was inserted.  Immediately after inserting the camera, it was noticed that patient had extensive adhesive disease.  Decision was made to lysis the adhesion in order to gain better visualization of the gallbladder.  This was done with laparoscopic scissors. Afterwards, we

## 2025-04-03 NOTE — ANESTHESIA PRE PROCEDURE
Department of Anesthesiology  Preprocedure Note       Name:  Lory Rodriguez   Age:  79 y.o.  :  1945                                          MRN:  [id number]         Date:  4/3/2025      Surgeon: Surgeon(s):  Jomar Araujo DO    Procedure: Procedure(s):  LAPAROSCOPIC ROBOTIC XI ASSISTED CHOLECYSTECTOMY POSSIBLE OPEN POSSIBLE GRAM    Medications prior to admission:   Prior to Admission medications    Medication Sig Start Date End Date Taking? Authorizing Provider   omeprazole (PRILOSEC) 20 MG delayed release capsule Take 1 capsule by mouth daily as needed (ACID REFLUX)   Yes ProviderVerona MD   vitamin D (CHOLECALCIFEROL) 25 MCG (1000 UT) TABS tablet Take 1 tablet by mouth every morning   Yes Verona Hernandez MD   meclizine (ANTIVERT) 25 MG tablet Take 1 tablet by mouth 3 times daily as needed for Dizziness   Yes ProviderVerona MD   calcium 600 MG TABS tablet Take 1 tablet by mouth every morning   Yes ProviderVerona MD       Current medications:    Current Facility-Administered Medications   Medication Dose Route Frequency Provider Last Rate Last Admin    ceFAZolin (ANCEF) 2,000 mg in sterile water 20 mL IV syringe  2,000 mg IntraVENous Once Mackenzie Cano DO        glucose chewable tablet 16 g  4 tablet Oral PRN LacKailyn love, APRN - CNP        dextrose bolus 10% 125 mL  125 mL IntraVENous PRN LacKailyn love APRN - CNP   Stopped at 25 1058    Or    dextrose bolus 10% 250 mL  250 mL IntraVENous PRN Kailyn Churchill, APRN - CNP        glucagon injection 1 mg  1 mg SubCUTAneous PRN LacKailyn love APRN - CNP        dextrose 10 % infusion   IntraVENous Continuous PRN LacKailyn love, APRN - CNP        dextrose 5 % and 0.45 % sodium chloride infusion   IntraVENous Continuous Walter Kirk  mL/hr at 25 0052 New Bag at 25 0052    fentaNYL (SUBLIMAZE) injection 25 mcg  25 mcg IntraVENous Q1H PRN Jewel Rogel DO        sodium chloride flush

## 2025-04-03 NOTE — PROGRESS NOTES
OR today with Dr. Araujo. No pain. Please keep NPO.    Electronically signed by Mackenzie Cano DO on 4/3/2025 at 5:16 AM

## 2025-04-03 NOTE — CARE COORDINATION
Updated plan of care. Pt NPO for lap cholecystectomy today. MRCP done on 4/2. Iv fluids. Will follow post op. At this time, pt has no needs for home-mjo

## 2025-04-03 NOTE — PROGRESS NOTES
Internal Medicine Progress Note    Patient's name: Lory Rodriguez  : 1945  Chief complaints (on day of admission): Abdominal Pain, Chest Pain, and Numbness (Left arm)  Admission date: 2025  Date of service: 4/3/2025   Room: [address]  Primary care physician: Justice Broussard DO  Reason for visit: Follow-up for gallstone pancreatitis    Subjective  Lory is seen lying in bed awake and alert, in no distress.  She reports feeling well, denies any pain or discomfort.  She denies any nausea or vomiting.  She reports that surgery was in this morning and plan is for laparoscopic cholecystectomy this afternoon.  She is asking if she will be able to go home after the procedure.  No other issues or concerns from nursing.    Review of Systems  Full 10 point review of systems negative unless mentioned above.    Hospital Medications  Current Facility-Administered Medications   Medication Dose Route Frequency Provider Last Rate Last Admin    glucose chewable tablet 16 g  4 tablet Oral PRN Kailyn Churchill APRN - CNP        dextrose bolus 10% 125 mL  125 mL IntraVENous PRN Kailyn Churchill APRN - CNP   Stopped at 25 1058    Or    dextrose bolus 10% 250 mL  250 mL IntraVENous PRN Kailyn Churchill, APRN - CNP        glucagon injection 1 mg  1 mg SubCUTAneous PRN Kailyn Churchill APRN - CNP        dextrose 10 % infusion   IntraVENous Continuous PRN Kailyn Churchill APRN - CNP        indocyanine green (IC-GREEN) syringe 5 mg  5 mg IntraVENous On Call to OR Mackenzie Cano DO        dextrose 5 % and 0.45 % sodium chloride infusion   IntraVENous Continuous Walter Kirk  mL/hr at 25 0052 New Bag at 25 0052    fentaNYL (SUBLIMAZE) injection 25 mcg  25 mcg IntraVENous Q1H PRN Jewel Rogel DO        sodium chloride flush 0.9 % injection 10 mL  10 mL IntraVENous 2 times per day Kailyn Churchill APRN - CNP   10 mL at 25 0740    sodium chloride flush 0.9 %

## 2025-04-03 NOTE — ANESTHESIA POSTPROCEDURE EVALUATION
Department of Anesthesiology  Postprocedure Note    Patient: Lory Rodriguez  MRN: [id number]  YOB: 1945  Date of evaluation: 4/3/2025    Procedure Summary       Date: 04/03/25 Room / Location: [address]    Anesthesia Start: 1324 Anesthesia Stop: 1451    Procedure: LAPAROSCOPIC ROBOTIC XI ASSISTED CHOLECYSTECTOMY INTRAOPERATIVE CHOLANGIOGRAM (Abdomen) Diagnosis:       Calculus of gallbladder with acute on chronic cholecystitis without obstruction      (Calculus of gallbladder with acute on chronic cholecystitis without obstruction [K80.12])    Surgeons: Jomar Araujo DO Responsible Provider: Elzbieta Alford DO    Anesthesia Type: General ASA Status: 2            Anesthesia Type: General    Maria A Phase I: Maria A Score: 10    Maria A Phase II:      Anesthesia Post Evaluation    Patient location during evaluation: PACU  Patient participation: complete - patient participated  Level of consciousness: awake and alert  Airway patency: patent  Nausea & Vomiting: no nausea and no vomiting  Cardiovascular status: hemodynamically stable  Respiratory status: acceptable  Hydration status: euvolemic  Pain management: adequate    No notable events documented.

## 2025-04-03 NOTE — PROGRESS NOTES
P Quality Flow/Interdisciplinary Rounds Progress Note        Quality Flow Rounds held on April 3, 2025    Disciplines Attending:  Bedside Nurse, , , and Nursing Unit Leadership    Lory Rodriguez was admitted on 4/1/2025  1:15 PM    Anticipated Discharge Date:       Disposition:    Emmett Score:  Emmett Scale Score: 22    [facility] RISK OF UNPLANNED READMISSION 2.0             8.1 Total Score        Discussed patient goal for the day, patient clinical progression, and barriers to discharge.  The following Goal(s) of the Day/Commitment(s) have been identified:   Discharge Planning      Shirley Sofia RN  April 3, 2025

## 2025-04-04 VITALS
SYSTOLIC BLOOD PRESSURE: 100 MMHG | BODY MASS INDEX: 21.28 KG/M2 | HEIGHT: 60 IN | WEIGHT: 108.4 LBS | RESPIRATION RATE: 16 BRPM | HEART RATE: 81 BPM | OXYGEN SATURATION: 99 % | TEMPERATURE: 97.9 F | DIASTOLIC BLOOD PRESSURE: 81 MMHG

## 2025-04-04 LAB
ALBUMIN SERPL-MCNC: 3.8 G/DL (ref 3.5–5.2)
ALP SERPL-CCNC: 153 U/L (ref 35–104)
ALT SERPL-CCNC: 264 U/L (ref 0–32)
ANION GAP SERPL CALCULATED.3IONS-SCNC: 10 MMOL/L (ref 7–16)
AST SERPL-CCNC: 272 U/L (ref 0–31)
BASOPHILS # BLD: 0.01 K/UL (ref 0–0.2)
BASOPHILS NFR BLD: 0 % (ref 0–2)
BILIRUB SERPL-MCNC: 1 MG/DL (ref 0–1.2)
BUN SERPL-MCNC: 7 MG/DL (ref 6–23)
CALCIUM SERPL-MCNC: 9.2 MG/DL (ref 8.6–10.2)
CHLORIDE SERPL-SCNC: 106 MMOL/L (ref 98–107)
CO2 SERPL-SCNC: 24 MMOL/L (ref 22–29)
CREAT SERPL-MCNC: 0.8 MG/DL (ref 0.5–1)
EOSINOPHIL # BLD: 0 K/UL (ref 0.05–0.5)
EOSINOPHILS RELATIVE PERCENT: 0 % (ref 0–6)
ERYTHROCYTE [DISTWIDTH] IN BLOOD BY AUTOMATED COUNT: 13.1 % (ref 11.5–15)
GFR, ESTIMATED: 80 ML/MIN/1.73M2
GLUCOSE BLD-MCNC: 123 MG/DL (ref 74–99)
GLUCOSE SERPL-MCNC: 126 MG/DL (ref 74–99)
HCT VFR BLD AUTO: 34 % (ref 34–48)
HGB BLD-MCNC: 10.9 G/DL (ref 11.5–15.5)
IMM GRANULOCYTES # BLD AUTO: 0.03 K/UL (ref 0–0.58)
IMM GRANULOCYTES NFR BLD: 0 % (ref 0–5)
LYMPHOCYTES NFR BLD: 0.68 K/UL (ref 1.5–4)
LYMPHOCYTES RELATIVE PERCENT: 10 % (ref 20–42)
MCH RBC QN AUTO: 30.8 PG (ref 26–35)
MCHC RBC AUTO-ENTMCNC: 32.1 G/DL (ref 32–34.5)
MCV RBC AUTO: 96 FL (ref 80–99.9)
MICROORGANISM SPEC CULT: NORMAL
MONOCYTES NFR BLD: 0.61 K/UL (ref 0.1–0.95)
MONOCYTES NFR BLD: 9 % (ref 2–12)
NEUTROPHILS NFR BLD: 81 % (ref 43–80)
NEUTS SEG NFR BLD: 5.62 K/UL (ref 1.8–7.3)
PLATELET # BLD AUTO: 167 K/UL (ref 130–450)
PMV BLD AUTO: 11.2 FL (ref 7–12)
POTASSIUM SERPL-SCNC: 4.3 MMOL/L (ref 3.5–5)
PROT SERPL-MCNC: 6.2 G/DL (ref 6.4–8.3)
RBC # BLD AUTO: 3.54 M/UL (ref 3.5–5.5)
SERVICE CMNT-IMP: NORMAL
SODIUM SERPL-SCNC: 140 MMOL/L (ref 132–146)
SPECIMEN DESCRIPTION: NORMAL
WBC OTHER # BLD: 7 K/UL (ref 4.5–11.5)

## 2025-04-04 PROCEDURE — 6370000000 HC RX 637 (ALT 250 FOR IP)

## 2025-04-04 PROCEDURE — 80053 COMPREHEN METABOLIC PANEL: CPT

## 2025-04-04 PROCEDURE — 2500000003 HC RX 250 WO HCPCS: Performed by: NURSE PRACTITIONER

## 2025-04-04 PROCEDURE — 6360000002 HC RX W HCPCS: Performed by: NURSE PRACTITIONER

## 2025-04-04 PROCEDURE — 82962 GLUCOSE BLOOD TEST: CPT

## 2025-04-04 PROCEDURE — 85025 COMPLETE CBC W/AUTO DIFF WBC: CPT

## 2025-04-04 RX ORDER — HYDROCODONE BITARTRATE AND ACETAMINOPHEN 5; 325 MG/1; MG/1
1 TABLET ORAL EVERY 6 HOURS PRN
Qty: 12 TABLET | Refills: 0 | Status: SHIPPED | OUTPATIENT
Start: 2025-04-04 | End: 2025-04-07

## 2025-04-04 RX ADMIN — SODIUM CHLORIDE, PRESERVATIVE FREE 10 ML: 5 INJECTION INTRAVENOUS at 07:48

## 2025-04-04 RX ADMIN — ACETAMINOPHEN 650 MG: 325 TABLET ORAL at 01:04

## 2025-04-04 RX ADMIN — ENOXAPARIN SODIUM 30 MG: 100 INJECTION SUBCUTANEOUS at 07:47

## 2025-04-04 RX ADMIN — ACETAMINOPHEN 650 MG: 325 TABLET ORAL at 07:49

## 2025-04-04 ASSESSMENT — PAIN DESCRIPTION - ORIENTATION
ORIENTATION: RIGHT;LOWER
ORIENTATION: OTHER (COMMENT)

## 2025-04-04 ASSESSMENT — PAIN - FUNCTIONAL ASSESSMENT: PAIN_FUNCTIONAL_ASSESSMENT: ACTIVITIES ARE NOT PREVENTED

## 2025-04-04 ASSESSMENT — PAIN DESCRIPTION - LOCATION
LOCATION: ABDOMEN
LOCATION: ABDOMEN

## 2025-04-04 ASSESSMENT — PAIN DESCRIPTION - DESCRIPTORS
DESCRIPTORS: TIGHTNESS;THROBBING;SQUEEZING
DESCRIPTORS: ACHING

## 2025-04-04 ASSESSMENT — PAIN SCALES - GENERAL
PAINLEVEL_OUTOF10: 5
PAINLEVEL_OUTOF10: 4

## 2025-04-04 NOTE — DISCHARGE SUMMARY
Internal Medicine Discharge Summary    NAME: Lory Rodriguez :  1945  MRN:  [id number] PCP:Justice Broussard DO    ADMITTED: 2025   DISCHARGED: 2025 11:03 AM    ADMITTING PHYSICIAN: Walter Kirk DO    PCP: Justice Broussard DO    CONSULTANT(S):   IP CONSULT TO GENERAL SURGERY  IP CONSULT TO INTERNAL MEDICINE     ADMITTING DIAGNOSIS:   Acute pancreatitis without necrosis or infection, unspecified [K85.90]  Acute pancreatitis, unspecified complication status, unspecified pancreatitis type [K85.90]     Please see H&P for further details    DISCHARGE DIAGNOSES:   Active Hospital Problems    Diagnosis     Acute pancreatitis [K85.90]     Hiatal hernia [K44.9]     Acute pancreatitis without necrosis or infection, unspecified [K85.90]        BRIEF HISTORY OF PRESENT ILLNESS: Lory Rodriguez is a 79 y.o. female patient of Justice Broussard DO who  has a past medical history of Hiatal hernia. who originally had concerns including Abdominal Pain, Chest Pain, and Numbness (Left arm). at presentation on 2025, and was found to have Acute pancreatitis without necrosis or infection, unspecified [K85.90]  Acute pancreatitis, unspecified complication status, unspecified pancreatitis type [K85.90] after workup.    Please see H&P for further details.    HOSPITAL COURSE:   The patient presented to the hospital with the chief complaint of Abdominal Pain, Chest Pain, and Numbness (Left arm)  . The patient was admitted to the hospital.     Acute Gallstone Pancreatitis and Acute Cholecystitis   CT abdomen/pelvis and RUQ US noted  Lipase >3000 --> 514 --> 535  LFTs still somewhat elevated, continue to monitor  MRCP noted  S/p laparoscopic cholecystectomy 4/3  Dilated pancreatic duct noted, needs follow-up with hepatobiliary surgery after discharge  Diet has been advanced and tolerating  General Surgery consult appreciated      Hypoglycemia, resolved  Hypoglycemia protocol is in place  IV fluids discontinued as diet

## 2025-04-04 NOTE — PLAN OF CARE
Problem: ABCDS Injury Assessment  Goal: Absence of physical injury  4/4/2025 1041 by Helen Mason RN  Outcome: Completed  4/4/2025 0951 by Helen Mason RN  Outcome: Progressing  Flowsheets (Taken 4/4/2025 0753)  Absence of Physical Injury: Implement safety measures based on patient assessment  4/3/2025 2338 by Miroslava Beltran RN  Outcome: Progressing     Problem: Discharge Planning  Goal: Discharge to home or other facility with appropriate resources  4/4/2025 1041 by Helen Mason RN  Outcome: Completed  4/4/2025 0951 by Helen Mason RN  Outcome: Progressing  4/3/2025 2338 by Miroslava Beltran RN  Outcome: Progressing     Problem: Safety - Adult  Goal: Free from fall injury  4/4/2025 1041 by Helen Mason RN  Outcome: Completed  4/4/2025 0951 by Helen Mason RN  Outcome: Progressing  4/3/2025 2338 by Miroslava Beltran RN  Outcome: Progressing

## 2025-04-04 NOTE — PROGRESS NOTES
Internal Medicine Progress Note    Patient's name: Lory Rodriguez  : 1945  Chief complaints (on day of admission): Abdominal Pain, Chest Pain, and Numbness (Left arm)  Admission date: 2025  Date of service: 2025   Room: [address]  Primary care physician: Justice Broussard DO  Reason for visit: Follow-up for gallstone pancreatitis    Subjective  Lory is seen sitting up in the chair, awake and alert, no distress.  She just returned from going to the bathroom.  She reports that she is feeling well today.  She does have some mild discomfort in her abdomen from surgery but otherwise has no complaints.  She denies any shortness of breath or difficulty breathing.  Denies any fever or chills.  Denies any nausea or vomiting.  She is asking to go home today.  No other issues or concerns from nursing.    Review of Systems  Full 10 point review of systems negative unless mentioned above.    Hospital Medications  Current Facility-Administered Medications   Medication Dose Route Frequency Provider Last Rate Last Admin    oxyCODONE (ROXICODONE) immediate release tablet 2.5 mg  2.5 mg Oral Q4H PRN Mackenzie Cano, DO   2.5 mg at 25 2239    Or    oxyCODONE (ROXICODONE) immediate release tablet 5 mg  5 mg Oral Q4H PRN Mackenzie Cano, DO        acetaminophen (TYLENOL) tablet 650 mg  650 mg Oral 4 times per day Mackenzie Cano DO   650 mg at 25 0104    glucose chewable tablet 16 g  4 tablet Oral PRN Kailyn Churchill APRN - CNP        dextrose bolus 10% 125 mL  125 mL IntraVENous PRN Kailyn Churchill APRN - CNP   Stopped at 25 1058    Or    dextrose bolus 10% 250 mL  250 mL IntraVENous PRN Kailyn Churchill APRN - CNP        glucagon injection 1 mg  1 mg SubCUTAneous PRN Kailyn Churchill APRN - CNP        dextrose 10 % infusion   IntraVENous Continuous PRN Kailyn Churchill APRN - CNP        sodium chloride flush 0.9 % injection 10 mL  10 mL IntraVENous 2 times per  room air  Heart: regular rate, regular rhythm, S1S2 present, no murmur noted  Abdomen: soft, laparoscopic sites noted with mild postoperative tenderness as expected, non-distended, normal bowel sounds  Extremities: atraumatic, no edema  Neurologic: following commands, speech is clear    Most Recent Labs  Lab Results   Component Value Date    WBC 7.0 04/04/2025    HGB 10.9 (L) 04/04/2025    HCT 34.0 04/04/2025     04/04/2025     04/04/2025    K 4.3 04/04/2025     04/04/2025    CREATININE 0.8 04/04/2025    BUN 7 04/04/2025    CO2 24 04/04/2025    GLUCOSE 126 (H) 04/04/2025     (H) 04/04/2025     (H) 04/04/2025    LABA1C 4.7 04/03/2025       FLUORO FOR SURGICAL PROCEDURES   Final Result   Intraprocedural fluoroscopic spot images as above.  See separate procedure   report for more information.         MRI ABDOMEN W WO CONTRAST MRCP   Final Result   1. No choledocholithiasis.   2. Mild gallbladder wall thickening again demonstrated without gallstones,   better characterized on previous day ultrasound.         CTA PULMONARY W CONTRAST   Final Result   1. No evidence of pulmonary embolism or acute pulmonary abnormality.   2. Hyperinflation. Bilateral apical pleural thickening.         CT ABDOMEN PELVIS W IV CONTRAST Additional Contrast? None   Final Result   1. Mildly distended gallbladder with pericholecystic fluid. No gallstones,   sludge or gallbladder wall thickening. If there is clinical concern for   cholecystitis, ultrasound is recommended.   2. Colonic fecal retention.   3. Grossly normal pancreas.         US ABDOMEN LIMITED   Final Result   Unremarkable right upper quadrant ultrasound.         XR CHEST (2 VW)   Final Result   No acute disease.             Assessment   Active Hospital Problems    Diagnosis     Acute pancreatitis [K85.90]     Hiatal hernia [K44.9]     Acute pancreatitis without necrosis or infection, unspecified [K85.90]          Plan  Acute Gallstone Pancreatitis

## 2025-04-04 NOTE — DISCHARGE INSTRUCTIONS
Instructions After Abdominal Surgery    You had abdominal surgery at [facility].  Please follow the instructions on this sheet for your follow-up care and make an appointment for a follow-up visit.    Activity/Sleep/Return to Work  Unless instructed otherwise, you may walk, climb stairs, ride as a passenger in a car, and engage in other activities of daily living as tolerated.  It is normal to feel fatigued and require more sleep than usual during your recovery.  Also, major surgery and being in the hospital can disrupt sleep patterns.  We recommend allowing sleep patterns to return to normal over time and avoid sleep medication unless previously prescribed.  Avoid heavy lifting (> 10 pounds or more) for 6 weeks.  Avoid driving for 2 weeks or as long as you have pain and are taking narcotic pain medication.  You may resume work as tolerated unless instructed otherwise or if your work involves heavy lifting.  Please bring any forms related to work, insurance, or disability to your first postoperative visit.    Bowel Movements  It is common to have irregular, loose watery stools for several days after abdominal surgery.  If watery diarrhea lasts more than a few days or you have more than 8 large volume liquid bowel movements in one day then call the office.  You may have altered bacteria in your colon and need a prescription for an antibiotic.  Avoid caffeine and alcohol (they alter bowel activity and can contribute to diarrhea or constipation).  You may take Mineral Oil 1 tablespoon twice daily to soften your stool.  If you have diarrhea, stop this medication.  If you have constipation and feel uncomfortable, then please call the office during office hours.    Urination    Patients who had a urinary catheter (“Coffman”) placed for surgery often have minor discomfort with urination for several days after removal of the catheter.  If discomfort persists or worsens, an infection may have occurred and  you should call the office.  If you are urinating in small amounts frequently (every hour or so), please call the office.  You may need to have a catheter reinserted for a few days to “retrain” your bladder to hold larger amounts of urine.    Diet  Unless otherwise instructed, you may eat anything in moderation.  Small frequent meals are easier to tolerate than large meals.  You may notice occasional abdominal cramping after eating.  These symptoms usually resolve with time.  It may help to eat smaller amounts of softer, bland food or to limit your diet to liquids (juice, soup, etc.) until these symptoms pass.  It is important to avoid dehydration.  Drink enough to keep your urine looking light yellow.  Avoid caffeine and alcohol because they are diuretics - they will make you urinate too much and cause dehydration.    Bathing/Incision (Wound) Care  Unless your doctor tells you otherwise, you may shower 24 hours postop  No Bathing, lakes, pools, submersion until seen in office  It is normal for a small amount of clear, yellow, or red-yellow fluid to drain from the incision.  However, if the drainage becomes thick or the skin around the incision becomes red and sore, an infection may have occurred.  You should then call the office.  The incision does not need to be covered.  You may cover it with a gauze bandage to protect clothing if drainage occurs.  If you go home with skin glue-allow to fall off naturally  If you go home with staples in place, they will usually be removed at your first follow-up office visit.  The incision will heal approximately 6 weeks from surgery.  It will take about 1 year for the scar to soften and take on its final appearance.    Pain and Pain Medication  Pain from the incision is normal.  Pain will vary from day to day and with changes in your activity level, but it should gradually decrease over time.  You may be given a prescription for pain medication (usually a narcotic like Percocet,

## 2025-04-04 NOTE — PLAN OF CARE
Problem: ABCDS Injury Assessment  Goal: Absence of physical injury  4/3/2025 2338 by Miroslava Beltran, RN  Outcome: Progressing     Problem: Discharge Planning  Goal: Discharge to home or other facility with appropriate resources  4/3/2025 2338 by Miroslava Beltran, RN  Outcome: Progressing     Problem: Safety - Adult  Goal: Free from fall injury  4/3/2025 2338 by Miroslava Beltran, RN  Outcome: Progressing

## 2025-04-04 NOTE — PROGRESS NOTES
General Surgery   Daily Progress Note      Patient's Name/Date of Birth: Lory Rodriguez / 1945    Date: April 4, 2025       Subjective: Doing well overall. Tolerating diet. No other issues.  Pain well controlled.     Objective:  BP (!) 123/53   Pulse 78   Temp 97.7 °F (36.5 °C) (Oral)   Resp 18   Ht 1.524 m (5')   Wt 50.1 kg (110 lb 7.2 oz)   SpO2 98%   BMI 21.57 kg/m²   Labs:  Recent Labs     04/01/25  1405 04/02/25  0500 04/03/25  0323   WBC 10.7 6.3 5.1   HGB 12.1 10.4* 10.0*   HCT 38.0 32.4* 31.9*     Recent Labs     04/01/25  1405 04/02/25  0500 04/03/25  0323    143 142   K 4.0 4.0 3.7    110* 109*   CO2 24 23 23   BUN 23 17 10   CREATININE 0.8 0.8 0.7     Recent Labs     04/01/25  1405 04/02/25  0500 04/02/25  0840 04/03/25  0323   ALKPHOS 132* 104  --  116*   * 247*  --  216*   * 241*  --  190*   BILITOT 2.1* 1.1  --  1.4*   LIPASE >3,000*  --  514* 535*         General appearance: NAD  Head: NCAT, PERRL, EOMI, pink conjunctiva  Neck: supple, no masses  Lungs: symmetric chest rise, no audible wheezes  Heart: warm throughout  Abdomen: soft, non-distended, non-tender to palpation throughout, incisions c/d/i  Skin: no visible lesions  Extremities: extremities normal, atraumatic, no cyanosis or edema      Assessment/Plan:  Lory Rodriguez is a 79 y.o. female with acute cholecystitis     - Ok for diet as tolerated  - no need for abx from our pov   - need to follow up with Dr. Robledo at [facility] clinic for dilated pancreatic duct       Mackenzie Cano DO  General Surgery Resident

## 2025-04-04 NOTE — PLAN OF CARE
Problem: ABCDS Injury Assessment  Goal: Absence of physical injury  4/4/2025 0951 by Helen Mason RN  Outcome: Progressing  Flowsheets (Taken 4/4/2025 0753)  Absence of Physical Injury: Implement safety measures based on patient assessment  4/3/2025 2338 by Miroslava Beltran RN  Outcome: Progressing     Problem: Discharge Planning  Goal: Discharge to home or other facility with appropriate resources  4/4/2025 0951 by Helen Mason RN  Outcome: Progressing  4/3/2025 2338 by Miroslava Beltran RN  Outcome: Progressing     Problem: Safety - Adult  Goal: Free from fall injury  4/4/2025 0951 by Helen Mason RN  Outcome: Progressing  4/3/2025 2338 by Miroslava Beltran RN  Outcome: Progressing

## 2025-04-07 ENCOUNTER — TELEPHONE (OUTPATIENT)
Dept: HEMATOLOGY | Age: 80
End: 2025-04-07

## 2025-04-07 ENCOUNTER — TELEPHONE (OUTPATIENT)
Dept: SURGERY | Age: 80
End: 2025-04-07

## 2025-04-07 NOTE — TELEPHONE ENCOUNTER
Scheduled 4/18/25 for post op LAPAROSCOPIC ROBOTIC XI ASSISTED CHOLECYSTECTOMY INTRAOPERATIVE CHOLANGIOGRAM dome on 4/3/25. Patient states legs were a little swollen the other day, but has since went down. Please advise patient for any further recommendations [phone].

## 2025-04-07 NOTE — TELEPHONE ENCOUNTER
Patient called in and was given instructions from her discharge to follow up with our office due to a family history of pancreatic cancer. I made her an appt on 4/23/25 at 10:30am at the [facility] clinic.  She was given directions to our office and she verbalized understanding and she confirmed this appt.    Electronically signed by Jie Milian RN on 4/7/2025 at 9:42 AM

## 2025-04-08 ENCOUNTER — TELEPHONE (OUTPATIENT)
Dept: SURGERY | Age: 80
End: 2025-04-08

## 2025-04-10 LAB — SURGICAL PATHOLOGY REPORT: NORMAL

## 2025-04-21 ENCOUNTER — OFFICE VISIT (OUTPATIENT)
Dept: SURGERY | Age: 80
End: 2025-04-21

## 2025-04-21 VITALS
HEART RATE: 71 BPM | DIASTOLIC BLOOD PRESSURE: 74 MMHG | WEIGHT: 101 LBS | BODY MASS INDEX: 19.83 KG/M2 | OXYGEN SATURATION: 97 % | HEIGHT: 60 IN | RESPIRATION RATE: 18 BRPM | TEMPERATURE: 97.8 F | SYSTOLIC BLOOD PRESSURE: 149 MMHG

## 2025-04-21 DIAGNOSIS — Z09 POSTOPERATIVE EXAMINATION: Primary | ICD-10-CM

## 2025-04-21 PROCEDURE — 99024 POSTOP FOLLOW-UP VISIT: CPT | Performed by: STUDENT IN AN ORGANIZED HEALTH CARE EDUCATION/TRAINING PROGRAM

## 2025-04-21 NOTE — PROGRESS NOTES
Postop Progress Note    Subjective    Lory Rodriguez presents to the office for postop follow up. She is doing well post robo kareem. She has minimal pain. She had cheesecake yesterday which did cause some GI upset but otherwise has not had issues. She has follow up with Dr. Hurd for her dilated pancreatic duct which we found intraop    Objective    Vitals:    04/21/25 1045   BP: (!) 149/74   Pulse: 71   Resp: 18   Temp: 97.8 °F (36.6 °C)   SpO2: 97%     General: alert, cooperative and no distress  Incisions: healing well, C/D/I    Assessment  Doing well postoperatively s/p robo kareem    Plan  1. Ok to resume all activities  2. F/u as needed    Electronically signed by Jomar Araujo DO on 4/21/2025 at 11:32 AM

## 2025-04-23 ENCOUNTER — OFFICE VISIT (OUTPATIENT)
Dept: HEMATOLOGY | Age: 80
End: 2025-04-23
Payer: MEDICARE

## 2025-04-23 VITALS
WEIGHT: 102.7 LBS | HEART RATE: 73 BPM | SYSTOLIC BLOOD PRESSURE: 142 MMHG | OXYGEN SATURATION: 98 % | TEMPERATURE: 98.1 F | BODY MASS INDEX: 20.16 KG/M2 | HEIGHT: 60 IN | DIASTOLIC BLOOD PRESSURE: 59 MMHG

## 2025-04-23 DIAGNOSIS — K86.89 DILATION OF PANCREATIC DUCT: Primary | ICD-10-CM

## 2025-04-23 DIAGNOSIS — K85.10 ACUTE BILIARY PANCREATITIS WITHOUT INFECTION OR NECROSIS: ICD-10-CM

## 2025-04-23 DIAGNOSIS — Z90.49 S/P LAPAROSCOPIC CHOLECYSTECTOMY: ICD-10-CM

## 2025-04-23 PROCEDURE — 99204 OFFICE O/P NEW MOD 45 MIN: CPT | Performed by: STUDENT IN AN ORGANIZED HEALTH CARE EDUCATION/TRAINING PROGRAM

## 2025-04-23 PROCEDURE — 1090F PRES/ABSN URINE INCON ASSESS: CPT | Performed by: STUDENT IN AN ORGANIZED HEALTH CARE EDUCATION/TRAINING PROGRAM

## 2025-04-23 PROCEDURE — 1111F DSCHRG MED/CURRENT MED MERGE: CPT | Performed by: STUDENT IN AN ORGANIZED HEALTH CARE EDUCATION/TRAINING PROGRAM

## 2025-04-23 PROCEDURE — 1036F TOBACCO NON-USER: CPT | Performed by: STUDENT IN AN ORGANIZED HEALTH CARE EDUCATION/TRAINING PROGRAM

## 2025-04-23 PROCEDURE — G8420 CALC BMI NORM PARAMETERS: HCPCS | Performed by: STUDENT IN AN ORGANIZED HEALTH CARE EDUCATION/TRAINING PROGRAM

## 2025-04-23 PROCEDURE — 1123F ACP DISCUSS/DSCN MKR DOCD: CPT | Performed by: STUDENT IN AN ORGANIZED HEALTH CARE EDUCATION/TRAINING PROGRAM

## 2025-04-23 PROCEDURE — G8427 DOCREV CUR MEDS BY ELIG CLIN: HCPCS | Performed by: STUDENT IN AN ORGANIZED HEALTH CARE EDUCATION/TRAINING PROGRAM

## 2025-04-23 PROCEDURE — G8400 PT W/DXA NO RESULTS DOC: HCPCS | Performed by: STUDENT IN AN ORGANIZED HEALTH CARE EDUCATION/TRAINING PROGRAM

## 2025-04-23 PROCEDURE — 1126F AMNT PAIN NOTED NONE PRSNT: CPT | Performed by: STUDENT IN AN ORGANIZED HEALTH CARE EDUCATION/TRAINING PROGRAM

## 2025-04-23 PROCEDURE — 1159F MED LIST DOCD IN RCRD: CPT | Performed by: STUDENT IN AN ORGANIZED HEALTH CARE EDUCATION/TRAINING PROGRAM

## 2025-04-23 ASSESSMENT — ENCOUNTER SYMPTOMS
ALLERGIC/IMMUNOLOGIC NEGATIVE: 1
GASTROINTESTINAL NEGATIVE: 1
EYES NEGATIVE: 1
RESPIRATORY NEGATIVE: 1

## 2025-04-23 NOTE — PROGRESS NOTES
Medications   Medication Sig Dispense Refill    omeprazole (PRILOSEC) 20 MG delayed release capsule Take 1 capsule by mouth daily as needed (ACID REFLUX)      vitamin D (CHOLECALCIFEROL) 25 MCG (1000 UT) TABS tablet Take 1 tablet by mouth every morning      meclizine (ANTIVERT) 25 MG tablet Take 1 tablet by mouth 3 times daily as needed for Dizziness      calcium 600 MG TABS tablet Take 1 tablet by mouth every morning       No current facility-administered medications for this visit.       Allergies   Allergen Reactions    Pcn [Penicillins] Hives    Strawberry Extract Hives       Family History   Problem Relation Age of Onset    Kidney Disease Mother     Heart Attack Father     Pancreatic Cancer Sister 80       Social History     Socioeconomic History    Marital status:      Spouse name: Not on file    Number of children: Not on file    Years of education: Not on file    Highest education level: Not on file   Occupational History    Not on file   Tobacco Use    Smoking status: Never     Passive exposure: Never    Smokeless tobacco: Never   Vaping Use    Vaping status: Never Used   Substance and Sexual Activity    Alcohol use: Not Currently    Drug use: No    Sexual activity: Not on file   Other Topics Concern    Not on file   Social History Narrative    Not on file     Social Drivers of Health     Financial Resource Strain: Not on file   Food Insecurity: No Food Insecurity (4/1/2025)    Hunger Vital Sign     Worried About Running Out of Food in the Last Year: Never true     Ran Out of Food in the Last Year: Never true   Transportation Needs: No Transportation Needs (4/1/2025)    PRAPARE - Transportation     Lack of Transportation (Medical): No     Lack of Transportation (Non-Medical): No   Physical Activity: Not on file   Stress: Not on file   Social Connections: Not on file   Intimate Partner Violence: Not on file   Housing Stability: Low Risk  (4/1/2025)    Housing Stability Vital Sign     Unable to Pay for

## (undated) DEVICE — Device

## (undated) DEVICE — COLUMN DRAPE

## (undated) DEVICE — SUCTION IRRIGATOR: Brand: ENDOWRIST

## (undated) DEVICE — APPLICATOR MEDICATED 26 CC SOLUTION HI LT ORNG CHLORAPREP

## (undated) DEVICE — BLADE,STAINLESS-STEEL,11,STRL,DISPOSABLE: Brand: MEDLINE

## (undated) DEVICE — ARM DRAPE

## (undated) DEVICE — ROUND TIP SCISSORS: Brand: ENDOWRIST

## (undated) DEVICE — KIT,ANTI FOG,W/SPONGE & FLUID,SOFT PACK: Brand: MEDLINE

## (undated) DEVICE — DOUBLE BASIN SET: Brand: MEDLINE INDUSTRIES, INC.

## (undated) DEVICE — PERMANENT CAUTERY HOOK: Brand: ENDOWRIST

## (undated) DEVICE — GLOVE ORANGE PI 7   MSG9070

## (undated) DEVICE — BLADELESS OBTURATOR: Brand: WECK VISTA

## (undated) DEVICE — MEDIUM-LARGE CLIP APPLIER: Brand: ENDOWRIST

## (undated) DEVICE — PROGRASP FORCEPS: Brand: ENDOWRIST

## (undated) DEVICE — DRAPE,LAP,CHOLE,W/TROUGHS,STERILE: Brand: MEDLINE

## (undated) DEVICE — INSUFFLATION TUBING SET WITH FILTER, FUNNEL CONNECTOR AND LUER LOCK: Brand: JOSNOE MEDICAL INC

## (undated) DEVICE — WARMER SCP 2 ANTIFOG LAP DISP

## (undated) DEVICE — FORCE BIPOLAR: Brand: ENDOWRIST

## (undated) DEVICE — NEEDLE HYPO 18GA L1.5IN PNK POLYPR HUB S STL REG BVL STR

## (undated) DEVICE — SEAL

## (undated) DEVICE — ELECTRODE PT RET AD L9FT HI MOIST COND ADH HYDRGEL CORDED

## (undated) DEVICE — LIQUIBAND RAPID ADHESIVE 36/CS 0.8ML: Brand: MEDLINE

## (undated) DEVICE — ANCHOR TISSUE RETRIEVAL SYSTEM, BAG SIZE 125 ML, PORT SIZE 8 MM: Brand: ANCHOR TISSUE RETRIEVAL SYSTEM

## (undated) DEVICE — TOWEL,OR,DSP,ST,BLUE,STD,6/PK,12PK/CS: Brand: MEDLINE

## (undated) DEVICE — INSUFFLATION NEEDLE TO ESTABLISH PNEUMOPERITONEUM.: Brand: INSUFFLATION NEEDLE

## (undated) DEVICE — SYRINGE 20ML LL S/C 50